# Patient Record
Sex: FEMALE | Race: OTHER | NOT HISPANIC OR LATINO
[De-identification: names, ages, dates, MRNs, and addresses within clinical notes are randomized per-mention and may not be internally consistent; named-entity substitution may affect disease eponyms.]

---

## 2017-02-26 ENCOUNTER — RX RENEWAL (OUTPATIENT)
Age: 82
End: 2017-02-26

## 2017-03-05 ENCOUNTER — RX RENEWAL (OUTPATIENT)
Age: 82
End: 2017-03-05

## 2017-04-12 ENCOUNTER — RX RENEWAL (OUTPATIENT)
Age: 82
End: 2017-04-12

## 2017-05-11 ENCOUNTER — RX RENEWAL (OUTPATIENT)
Age: 82
End: 2017-05-11

## 2017-06-05 ENCOUNTER — APPOINTMENT (OUTPATIENT)
Dept: INTERNAL MEDICINE | Facility: CLINIC | Age: 82
End: 2017-06-05

## 2017-06-26 ENCOUNTER — APPOINTMENT (OUTPATIENT)
Dept: INTERNAL MEDICINE | Facility: CLINIC | Age: 82
End: 2017-06-26

## 2017-06-26 VITALS
OXYGEN SATURATION: 96 % | SYSTOLIC BLOOD PRESSURE: 160 MMHG | RESPIRATION RATE: 12 BRPM | WEIGHT: 140 LBS | BODY MASS INDEX: 32.4 KG/M2 | TEMPERATURE: 98 F | HEART RATE: 55 BPM | HEIGHT: 55 IN | DIASTOLIC BLOOD PRESSURE: 73 MMHG

## 2017-06-26 VITALS — SYSTOLIC BLOOD PRESSURE: 140 MMHG | DIASTOLIC BLOOD PRESSURE: 70 MMHG

## 2017-06-27 LAB
25(OH)D3 SERPL-MCNC: 26.5 NG/ML
ALBUMIN SERPL ELPH-MCNC: 4.3 G/DL
ALP BLD-CCNC: 107 U/L
ALT SERPL-CCNC: 12 U/L
ANION GAP SERPL CALC-SCNC: 16 MMOL/L
AST SERPL-CCNC: 21 U/L
BILIRUB SERPL-MCNC: 0.4 MG/DL
BUN SERPL-MCNC: 29 MG/DL
CALCIUM SERPL-MCNC: 9.3 MG/DL
CHLORIDE SERPL-SCNC: 107 MMOL/L
CHOLEST SERPL-MCNC: 211 MG/DL
CHOLEST/HDLC SERPL: 6.4 RATIO
CO2 SERPL-SCNC: 18 MMOL/L
CREAT SERPL-MCNC: 1.47 MG/DL
GLUCOSE SERPL-MCNC: 84 MG/DL
HBA1C MFR BLD HPLC: 5.8 %
HDLC SERPL-MCNC: 33 MG/DL
LDLC SERPL CALC-MCNC: 131 MG/DL
POTASSIUM SERPL-SCNC: 5.5 MMOL/L
PROT SERPL-MCNC: 8.1 G/DL
SODIUM SERPL-SCNC: 141 MMOL/L
T4 FREE SERPL-MCNC: 1.4 NG/DL
TRIGL SERPL-MCNC: 235 MG/DL
TSH SERPL-ACNC: 1.34 UIU/ML

## 2017-06-28 LAB
BASOPHILS # BLD AUTO: 0.03 K/UL
BASOPHILS NFR BLD AUTO: 0.4 %
EOSINOPHIL # BLD AUTO: 0.16 K/UL
EOSINOPHIL NFR BLD AUTO: 2.2 %
HCT VFR BLD CALC: 42.2 %
HGB BLD-MCNC: 13.4 G/DL
IMM GRANULOCYTES NFR BLD AUTO: 0.3 %
LYMPHOCYTES # BLD AUTO: 2.96 K/UL
LYMPHOCYTES NFR BLD AUTO: 40.2 %
MAN DIFF?: NORMAL
MCHC RBC-ENTMCNC: 27.6 PG
MCHC RBC-ENTMCNC: 31.8 GM/DL
MCV RBC AUTO: 87 FL
MONOCYTES # BLD AUTO: 0.72 K/UL
MONOCYTES NFR BLD AUTO: 9.8 %
NEUTROPHILS # BLD AUTO: 3.47 K/UL
NEUTROPHILS NFR BLD AUTO: 47.1 %
PLATELET # BLD AUTO: 153 K/UL
RBC # BLD: 4.85 M/UL
RBC # FLD: 15.5 %
WBC # FLD AUTO: 7.36 K/UL

## 2017-07-17 ENCOUNTER — INPATIENT (INPATIENT)
Facility: HOSPITAL | Age: 82
LOS: 6 days | Discharge: NURSING, REHAB OR LONG-TERM CARE | DRG: 65 | End: 2017-07-24
Attending: PSYCHIATRY & NEUROLOGY | Admitting: PSYCHIATRY & NEUROLOGY
Payer: MEDICAID

## 2017-07-17 VITALS
OXYGEN SATURATION: 97 % | DIASTOLIC BLOOD PRESSURE: 63 MMHG | RESPIRATION RATE: 18 BRPM | TEMPERATURE: 98 F | HEART RATE: 63 BPM | SYSTOLIC BLOOD PRESSURE: 158 MMHG

## 2017-07-17 DIAGNOSIS — I63.9 CEREBRAL INFARCTION, UNSPECIFIED: ICD-10-CM

## 2017-07-17 LAB
ALBUMIN SERPL ELPH-MCNC: 4 G/DL — SIGNIFICANT CHANGE UP (ref 3.3–5)
ALP SERPL-CCNC: 94 U/L — SIGNIFICANT CHANGE UP (ref 40–120)
ALT FLD-CCNC: 11 U/L RC — SIGNIFICANT CHANGE UP (ref 10–45)
ANION GAP SERPL CALC-SCNC: 14 MMOL/L — SIGNIFICANT CHANGE UP (ref 5–17)
APTT BLD: 27.7 SEC — SIGNIFICANT CHANGE UP (ref 27.5–37.4)
AST SERPL-CCNC: 26 U/L — SIGNIFICANT CHANGE UP (ref 10–40)
BASOPHILS # BLD AUTO: 0.1 K/UL — SIGNIFICANT CHANGE UP (ref 0–0.2)
BASOPHILS NFR BLD AUTO: 0.6 % — SIGNIFICANT CHANGE UP (ref 0–2)
BILIRUB SERPL-MCNC: 0.4 MG/DL — SIGNIFICANT CHANGE UP (ref 0.2–1.2)
BUN SERPL-MCNC: 32 MG/DL — HIGH (ref 7–23)
CALCIUM SERPL-MCNC: 9.4 MG/DL — SIGNIFICANT CHANGE UP (ref 8.4–10.5)
CHLORIDE SERPL-SCNC: 105 MMOL/L — SIGNIFICANT CHANGE UP (ref 96–108)
CO2 SERPL-SCNC: 21 MMOL/L — LOW (ref 22–31)
CREAT SERPL-MCNC: 1.42 MG/DL — HIGH (ref 0.5–1.3)
EOSINOPHIL # BLD AUTO: 0.1 K/UL — SIGNIFICANT CHANGE UP (ref 0–0.5)
EOSINOPHIL NFR BLD AUTO: 1.4 % — SIGNIFICANT CHANGE UP (ref 0–6)
GLUCOSE SERPL-MCNC: 79 MG/DL — SIGNIFICANT CHANGE UP (ref 70–99)
HCT VFR BLD CALC: 42.7 % — SIGNIFICANT CHANGE UP (ref 34.5–45)
HGB BLD-MCNC: 14.2 G/DL — SIGNIFICANT CHANGE UP (ref 11.5–15.5)
INR BLD: 1.05 RATIO — SIGNIFICANT CHANGE UP (ref 0.88–1.16)
LYMPHOCYTES # BLD AUTO: 2.8 K/UL — SIGNIFICANT CHANGE UP (ref 1–3.3)
LYMPHOCYTES # BLD AUTO: 33.8 % — SIGNIFICANT CHANGE UP (ref 13–44)
MCHC RBC-ENTMCNC: 28.8 PG — SIGNIFICANT CHANGE UP (ref 27–34)
MCHC RBC-ENTMCNC: 33.2 GM/DL — SIGNIFICANT CHANGE UP (ref 32–36)
MCV RBC AUTO: 86.5 FL — SIGNIFICANT CHANGE UP (ref 80–100)
MONOCYTES # BLD AUTO: 0.9 K/UL — SIGNIFICANT CHANGE UP (ref 0–0.9)
MONOCYTES NFR BLD AUTO: 11.5 % — SIGNIFICANT CHANGE UP (ref 2–14)
NEUTROPHILS # BLD AUTO: 4.3 K/UL — SIGNIFICANT CHANGE UP (ref 1.8–7.4)
NEUTROPHILS NFR BLD AUTO: 52.6 % — SIGNIFICANT CHANGE UP (ref 43–77)
PLATELET # BLD AUTO: 130 K/UL — LOW (ref 150–400)
POTASSIUM SERPL-MCNC: 5.2 MMOL/L — SIGNIFICANT CHANGE UP (ref 3.5–5.3)
POTASSIUM SERPL-SCNC: 5.2 MMOL/L — SIGNIFICANT CHANGE UP (ref 3.5–5.3)
PROT SERPL-MCNC: 7.7 G/DL — SIGNIFICANT CHANGE UP (ref 6–8.3)
PROTHROM AB SERPL-ACNC: 11.3 SEC — SIGNIFICANT CHANGE UP (ref 9.8–12.7)
RBC # BLD: 4.93 M/UL — SIGNIFICANT CHANGE UP (ref 3.8–5.2)
RBC # FLD: 13 % — SIGNIFICANT CHANGE UP (ref 10.3–14.5)
SODIUM SERPL-SCNC: 140 MMOL/L — SIGNIFICANT CHANGE UP (ref 135–145)
WBC # BLD: 8.2 K/UL — SIGNIFICANT CHANGE UP (ref 3.8–10.5)
WBC # FLD AUTO: 8.2 K/UL — SIGNIFICANT CHANGE UP (ref 3.8–10.5)

## 2017-07-17 PROCEDURE — 70450 CT HEAD/BRAIN W/O DYE: CPT | Mod: 26

## 2017-07-17 PROCEDURE — 71010: CPT | Mod: 26

## 2017-07-17 PROCEDURE — 72125 CT NECK SPINE W/O DYE: CPT | Mod: 26

## 2017-07-17 PROCEDURE — 99285 EMERGENCY DEPT VISIT HI MDM: CPT | Mod: 25

## 2017-07-17 PROCEDURE — 93010 ELECTROCARDIOGRAM REPORT: CPT

## 2017-07-17 RX ORDER — LEVOTHYROXINE SODIUM 125 MCG
0 TABLET ORAL
Qty: 0 | Refills: 0 | COMMUNITY

## 2017-07-17 RX ORDER — ENOXAPARIN SODIUM 100 MG/ML
40 INJECTION SUBCUTANEOUS EVERY 24 HOURS
Qty: 0 | Refills: 0 | Status: DISCONTINUED | OUTPATIENT
Start: 2017-07-17 | End: 2017-07-17

## 2017-07-17 RX ORDER — LEVOTHYROXINE SODIUM 125 MCG
1 TABLET ORAL
Qty: 0 | Refills: 0 | COMMUNITY

## 2017-07-17 RX ORDER — LOSARTAN POTASSIUM 100 MG/1
1 TABLET, FILM COATED ORAL
Qty: 0 | Refills: 0 | COMMUNITY

## 2017-07-17 RX ORDER — LOSARTAN POTASSIUM 100 MG/1
25 TABLET, FILM COATED ORAL DAILY
Qty: 0 | Refills: 0 | Status: DISCONTINUED | OUTPATIENT
Start: 2017-07-17 | End: 2017-07-24

## 2017-07-17 RX ORDER — ENOXAPARIN SODIUM 100 MG/ML
30 INJECTION SUBCUTANEOUS EVERY 24 HOURS
Qty: 0 | Refills: 0 | Status: DISCONTINUED | OUTPATIENT
Start: 2017-07-17 | End: 2017-07-24

## 2017-07-17 RX ORDER — ASPIRIN/CALCIUM CARB/MAGNESIUM 324 MG
81 TABLET ORAL DAILY
Qty: 0 | Refills: 0 | Status: DISCONTINUED | OUTPATIENT
Start: 2017-07-17 | End: 2017-07-24

## 2017-07-17 RX ORDER — LOSARTAN POTASSIUM 100 MG/1
50 TABLET, FILM COATED ORAL
Qty: 0 | Refills: 0 | COMMUNITY

## 2017-07-17 RX ORDER — LEVOTHYROXINE SODIUM 125 MCG
50 TABLET ORAL
Qty: 0 | Refills: 0 | COMMUNITY

## 2017-07-17 RX ORDER — METOPROLOL TARTRATE 50 MG
50 TABLET ORAL ONCE
Qty: 0 | Refills: 0 | Status: COMPLETED | OUTPATIENT
Start: 2017-07-17 | End: 2017-07-17

## 2017-07-17 RX ORDER — ATORVASTATIN CALCIUM 80 MG/1
80 TABLET, FILM COATED ORAL AT BEDTIME
Qty: 0 | Refills: 0 | Status: DISCONTINUED | OUTPATIENT
Start: 2017-07-17 | End: 2017-07-24

## 2017-07-17 RX ORDER — METOPROLOL TARTRATE 50 MG
1 TABLET ORAL
Qty: 0 | Refills: 0 | COMMUNITY

## 2017-07-17 RX ADMIN — ATORVASTATIN CALCIUM 80 MILLIGRAM(S): 80 TABLET, FILM COATED ORAL at 20:17

## 2017-07-17 RX ADMIN — Medication 81 MILLIGRAM(S): at 16:33

## 2017-07-17 RX ADMIN — LOSARTAN POTASSIUM 25 MILLIGRAM(S): 100 TABLET, FILM COATED ORAL at 16:33

## 2017-07-17 RX ADMIN — Medication 50 MILLIGRAM(S): at 16:33

## 2017-07-17 NOTE — H&P ADULT - NSHPPHYSICALEXAM_GEN_ALL_CORE
General Adult Exam  GENERAL APPEARANCE: Well developed, well nourished, alert and cooperative, and appears to be in no acute distress.  EYES: PERRL, EOMI. Fundi normal, vision is grossly intact.  SKIN: Skin normal color, texture and turgor with no lesions or eruptions.    Neurological Exam:  Mental Status: Orientated to self, date and place.  Attention intact.  No dysarthria, aphasia or neglect.  Knowledge intact.  Registration intact.  Short and long term memory grossly intact.      Cranial Nerves: CN I - not tested.  PERRL, EOMI, VFF, no nystagmus or diplopia.  No APD.  CN V1-3 intact to light touch and pinprick.  No facial asymmetry.  Hearing intact to finger rub bilaterally.  Tongue, uvula and palate midline.  Sternocleidomastoid and Trapezius intact bilaterally.    Motor:   Tone: normal.                  Strength:     [] Upper extremity                      Delt       Bicep    Tricep                                                  R         4/5        4/5        4/5       4/5                                               L          5/5        5/5        5/5       5/5  [] Lower extremity                       HF          KE          KF        DF         PF                                               R        4/5        4/5        4/5       4/5       4/5                                               L         5/5        5/5       5/5       5/5        5/5  Pronator drift: none                 Dysmetria: Positive on right finger-nose-finger and heel-shin-heel  Tremor: No resting, postural or action tremor.  No myoclonus.    Sensation: decreased sensation in RUE and RLE to light touch, pinprick    Deep Tendon Reflexes: 2+ right biceps, brachioradialis, knee; 1+ left biceps, brachioradialis, knee; absent ankle  Toes: Up on right, down on left    Gait: Unsteady gait, needs assistance to walk.

## 2017-07-17 NOTE — ED PROVIDER NOTE - MUSCULOSKELETAL, MLM
No midlines spinal tenderness.  5/5 strength b/l UE and LE with slight discrepancy of right being weaker than left.

## 2017-07-17 NOTE — ED ADULT NURSE REASSESSMENT NOTE - NS ED NURSE REASSESS COMMENT FT1
1900 Pt passed dysphagia and pt provided a sandwich at this time blood pressure better and lower Mpuleorn

## 2017-07-17 NOTE — H&P ADULT - ASSESSMENT
88 year old female with history of hypothyroidism, HTN, GERD presenting with right sided weakness with decreased sensation for the past 2 weeks. Neuro exam reveals reflexes greater on the right with upward plantar flexion, decreased sensation in R sided extremities which is likely due to left sided infarct. 88 year old female with history of hypothyroidism, HTN, GERD presenting with right sided weakness with decreased sensation for the past 2 weeks. Neuro exam reveals reflexes greater on the right with upward plantar flexion, decreased sensation in R sided extremities which is likely due to left sided infarct. CT head negative for acute pathology.

## 2017-07-17 NOTE — ED ADULT NURSE NOTE - OBJECTIVE STATEMENT
Pt fell a week ago felt week on the rue and rle Pt has been using a  cane at home to ambulate around the house.  Pt has some weakness right lower but can ambulate with some assistance

## 2017-07-17 NOTE — ED PROVIDER NOTE - OBJECTIVE STATEMENT
88 y.o. s/p mechanical fall 1 week ago onto her right side.  Was able to ambulate after wards.  Since has developed some right sided weakness 88 y.o. s/p fall 1 week ago.  Pt was walking in the supermarket when her right side went numb, her right leg gave out and she fell to her left side.  Was able to get up with assistance and walk to her car and get home.  Since she has been living at home, performing all activity of daily living however feels slightly weak in the RUE and right LE.  Has also had persistent numbness in the RLE and RUE.  Has been relying on her cane more but no other complaints.  No pain, no headaches, no fevers no chills.  Did not hit head when she fell, no neck pain

## 2017-07-17 NOTE — H&P ADULT - NSHPLABSRESULTS_GEN_ALL_CORE
14.2   8.2   )-----------( 130      ( 17 Jul 2017 15:18 )             42.7     07-17    140  |  105  |  32<H>  ----------------------------<  79  5.2   |  21<L>  |  1.42<H>    Ca    9.4      17 Jul 2017 15:18    TPro  7.7  /  Alb  4.0  /  TBili  0.4  /  DBili  x   /  AST  26  /  ALT  11  /  AlkPhos  94  07-17    PT/INR - ( 17 Jul 2017 15:18 )   PT: 11.3 sec;   INR: 1.05 ratio      PTT - ( 17 Jul 2017 15:18 )  PTT:27.7 sec    I&O's Summary    Vital Signs Last 24 Hrs  T(C): 36.6 (17 Jul 2017 12:34), Max: 36.6 (17 Jul 2017 12:34)  T(F): 97.9 (17 Jul 2017 12:34), Max: 97.9 (17 Jul 2017 12:34)  HR: 63 (17 Jul 2017 12:34) (63 - 63)  BP: 158/63 (17 Jul 2017 12:34) (158/63 - 158/63)  BP(mean): --  RR: 18 (17 Jul 2017 12:34) (18 - 18)  SpO2: 97% (17 Jul 2017 12:34) (97% - 97%) 14.2   8.2   )-----------( 130      ( 17 Jul 2017 15:18 )             42.7     07-17    140  |  105  |  32<H>  ----------------------------<  79  5.2   |  21<L>  |  1.42<H>    Ca    9.4      17 Jul 2017 15:18    TPro  7.7  /  Alb  4.0  /  TBili  0.4  /  DBili  x   /  AST  26  /  ALT  11  /  AlkPhos  94  07-17    PT/INR - ( 17 Jul 2017 15:18 )   PT: 11.3 sec;   INR: 1.05 ratio      PTT - ( 17 Jul 2017 15:18 )  PTT:27.7 sec    I&O's Summary    Vital Signs Last 24 Hrs  T(C): 36.6 (17 Jul 2017 12:34), Max: 36.6 (17 Jul 2017 12:34)  T(F): 97.9 (17 Jul 2017 12:34), Max: 97.9 (17 Jul 2017 12:34)  HR: 63 (17 Jul 2017 12:34) (63 - 63)  BP: 158/63 (17 Jul 2017 12:34) (158/63 - 158/63)  BP(mean): --  RR: 18 (17 Jul 2017 12:34) (18 - 18)  SpO2: 97% (17 Jul 2017 12:34) (97% - 97%)    CT Head: No  acute  hemorrhage,  major  distribution  infarct  or  mass  effect.

## 2017-07-17 NOTE — ED ADULT NURSE NOTE - CHIEF COMPLAINT QUOTE
mechanical trip and fall one week ago and since then has been having right hip/leg pain/weakness  denies head inj or loc  denies blood thinners

## 2017-07-17 NOTE — H&P ADULT - NSHPREVIEWOFSYSTEMS_GEN_ALL_CORE
REVIEW OF SYSTEMS:  Constitutional: No fever, chills, fatigue, weakness.  Eyes: No eye pain, visual disturbances, or discharge.  ENT:  No difficulty hearing, tinnitus, vertigo. No sinus or throat pain.  Neck: No pain or stiffness.  Respiratory: No cough, dyspnea, wheezing.  Cardiovascular: No chest pain, palpitations.  Gastrointestinal: No abdominal pain. No nausea, vomiting, diarrhea, or constipation.   Genitourinary: No dysuria, frequency, hematuria or incontinence.  Neurological: Decreased sensation over right upper and lower extremities. No headaches, lightheadedness, vertigo, numbness or tremors.  Psychiatric: No depression, anxiety, mood swings, or difficulty sleeping.  Musculoskeletal: No joint pain or swelling. No muscle, back, or extremity pain.  Skin: No itching, burning, rashes or lesions.   Lymph Nodes: No enlarged glands  Endocrine: No heat or cold intolerance, no hair loss.  Allergy and Immunologic: No hives or eczema.

## 2017-07-17 NOTE — H&P ADULT - HISTORY OF PRESENT ILLNESS
Patient is an 88 year old female presenting to the ED with right sided weakness for the past 2 weeks. Patient has PMH of hypothyroidism, HTN, and GERD. As per patient, she describes the weakness as a feeling of emptiness on the right upper and lower extremities with decreased sensation. She denies numbness or tingling. She almost fell 1 week ago which was when she told her daughter about the symptoms. Today her daughter called the PMD and was told to come to the ED. Patient at baseline ambulates with a cane and is able to eat and use the bathroom by herself. Family is unable to help her up and down the stairs at home.

## 2017-07-17 NOTE — ED ADULT TRIAGE NOTE - CHIEF COMPLAINT QUOTE
mechanical trip and fall one week ago and since then has been having right hip/leg pain  denies head inj or loc  denies blood thinners

## 2017-07-17 NOTE — ED PROVIDER NOTE - ATTENDING CONTRIBUTION TO CARE
88y F hx HTN here with R sided numbness, weakness. Pt was walking in supermarket about 1 week ago when her R side went numb and she collapsed to the ground onto her L side. Over last week has had inc difficulty ambulating and persistent weakness to RUE and RLE. On exam A&Ox3, CN intact, motor 5/5 bl LUE and LLE, motor 4/5 RLE, 5/5 RUE, dysmetria on finger to nose and heel to shin. RRR no murmur CTA BL abd soft NTND. A: 88y F hx HTN here w R sided weakness and gait instability x1 week, r/o CVA. Labs, CTBrain, Neuro cs

## 2017-07-17 NOTE — PATIENT PROFILE ADULT. - ABILITY TO HEAR (WITH HEARING AID OR HEARING APPLIANCE IF NORMALLY USED):
Mildly to Moderately Impaired: difficulty hearing in some environments or speaker may need to increase volume or speak distinctly/L ear hearing loss

## 2017-07-17 NOTE — H&P ADULT - ATTENDING COMMENTS
The patient was seen and examined by me. Imaging (head CT, brain MRI, MRA neck and head) reviewed by me. This is an 88F with HTN, GERD who presents with right hemiparesis for 2 weeks. MRI shows a left thalamocapsular acute ischemic stroke, likely small vessel disease/lacunar etiology. On exam she is awake and alert, oriented x3, fluent, normal naming and repetition, follows 3 step commands. Extraocular movements intact, no nystagmus, visual fields full, face symmetric, tongue midline. No drift, 4+/5 RUE and RLE. 5/5 power LUE and LLE. Decreased sensation to LT RUE and RLE, facial sparing. Normal finger-to-nose and rapid alternating movements on the left. Impression: Cerebral thrombosis with cerebral infarction secondary to HTN. Plan is ASA, Lipitor, continue ARB for hypertension, PT/OT evals, passed dysphagia and tolerating diet, so no speech and swallow eval needed. Lovenox VTE prophylaxis. TTE, but can be down as outpatient if it delays discharge. Dispo pending PT/OT evals. Neuro resident to communicate with the patients family who were not at bedside today.

## 2017-07-18 ENCOUNTER — TRANSCRIPTION ENCOUNTER (OUTPATIENT)
Age: 82
End: 2017-07-18

## 2017-07-18 PROCEDURE — 70551 MRI BRAIN STEM W/O DYE: CPT | Mod: 26

## 2017-07-18 PROCEDURE — 70547 MR ANGIOGRAPHY NECK W/O DYE: CPT | Mod: 26

## 2017-07-18 PROCEDURE — 99223 1ST HOSP IP/OBS HIGH 75: CPT

## 2017-07-18 RX ORDER — ASPIRIN/CALCIUM CARB/MAGNESIUM 324 MG
1 TABLET ORAL
Qty: 0 | Refills: 0 | COMMUNITY
Start: 2017-07-18

## 2017-07-18 RX ADMIN — LOSARTAN POTASSIUM 25 MILLIGRAM(S): 100 TABLET, FILM COATED ORAL at 06:26

## 2017-07-18 RX ADMIN — Medication 1 DROP(S): at 22:06

## 2017-07-18 RX ADMIN — Medication 81 MILLIGRAM(S): at 11:49

## 2017-07-18 RX ADMIN — ATORVASTATIN CALCIUM 80 MILLIGRAM(S): 80 TABLET, FILM COATED ORAL at 22:06

## 2017-07-18 RX ADMIN — ENOXAPARIN SODIUM 30 MILLIGRAM(S): 100 INJECTION SUBCUTANEOUS at 00:27

## 2017-07-18 RX ADMIN — ENOXAPARIN SODIUM 30 MILLIGRAM(S): 100 INJECTION SUBCUTANEOUS at 22:06

## 2017-07-18 NOTE — DISCHARGE NOTE ADULT - ABILITY TO HEAR (WITH HEARING AID OR HEARING APPLIANCE IF NORMALLY USED):
L ear hearing loss/Mildly to Moderately Impaired: difficulty hearing in some environments or speaker may need to increase volume or speak distinctly

## 2017-07-18 NOTE — DISCHARGE NOTE ADULT - MEDICATION SUMMARY - MEDICATIONS TO TAKE
I will START or STAY ON the medications listed below when I get home from the hospital:    aspirin 81 mg oral delayed release tablet  -- 1 tab(s) by mouth once a day  -- Indication: For Stroke    losartan 50 mg oral tablet  -- 1 tab(s) by mouth once a day  -- Indication: For Hypertension    atorvastatin 80 mg oral tablet  -- 1 tab(s) by mouth once a day (at bedtime)  -- Indication: For Hyperlipidemia    metoprolol succinate 25 mg oral tablet, extended release  -- 1 tab(s) by mouth once a day  -- Indication: For Hypertension    ocular lubricant ophthalmic solution  -- 1 drop(s) to each affected eye every 12 hours, As needed, Dry Eyes  -- Indication: For Eye Drops    levothyroxine 50 mcg (0.05 mg) oral capsule  -- 1 cap(s) by mouth once a day  -- Indication: For Hypothyroid

## 2017-07-18 NOTE — DISCHARGE NOTE ADULT - PATIENT PORTAL LINK FT
“You can access the [employer] Patient Portal, offered by [facility], by registering with the following website: [email or web address]”

## 2017-07-18 NOTE — DISCHARGE NOTE ADULT - CARE PLAN
Principal Discharge DX:	Cerebrovascular accident (CVA), unspecified mechanism  Goal:	Secondary Stroke Prevention  Instructions for follow-up, activity and diet:	Please follow up with your primary medical doctor within one week of discharge from the hospital.    Please follow up with your neurologist within one week of discharge from the hospital.    Please continue to take all your medications as prescribed by your doctor.    Please continue to participate in your physical therapy as directed to regain strength. Principal Discharge DX:	Cerebrovascular accident (CVA), unspecified mechanism  Goal:	Secondary Stroke Prevention  Instructions for follow-up, activity and diet:	Please follow up with your primary medical doctor within one to two weeks of discharge from the hospital.    Please follow up with your stroke neurologist (Dr. Jamil Philippe or Dr. Aurelio Currie) within one to two weeks of discharge from the hospital.    Please continue to take all your medications as prescribed by your doctor.    Please continue to participate in your physical therapy as directed to regain strength. Principal Discharge DX:	Cerebrovascular accident (CVA), unspecified mechanism  Goal:	Secondary Stroke Prevention  Instructions for follow-up, activity and diet:	Please continue all meds as directed. Follow-up with Stroke Neurologist Dr. Currie or Dr. Jamil Philippe as next available.     Please follow up with your stroke neurologist (Dr. Jamil Philippe or Dr. Aurelio Currie) within one to two weeks of discharge from the hospital.    Please continue to take all your medications as prescribed by your doctor.    Please continue to participate in your physical therapy as directed to regain strength.

## 2017-07-18 NOTE — DISCHARGE NOTE ADULT - HOSPITAL COURSE
Patient is an 88 year old female presenting to the ED with right sided weakness for the past 2 weeks. Patient has PMH of hypothyroidism, HTN, and GERD. As per patient, she describes the weakness as a feeling of emptiness on the right upper and lower extremities with decreased sensation. She denies numbness or tingling. She almost fell 1 week ago which was when she told her daughter about the symptoms. Patient at baseline ambulates with a cane and is able to eat and use the bathroom by herself. Family is unable to help her up and down the stairs at home.    Pt was admitted to the stroke unit on 7/18, MRI showed acute/subacute infarct of the posterior limb capsule with involvement of a small portion of the globus pallidus. Neck MRA showed significant stenosis. Brain MRA showed possible focal narrowing of the distal right internal carotid artery. No evidence for occlusion. Patient is an 88 year old female presenting to the ED with right sided weakness for the past 2 weeks. Patient has PMH of hypothyroidism, HTN, and GERD. As per patient, she describes the weakness as a feeling of emptiness on the right upper and lower extremities with decreased sensation. She denies numbness or tingling. She almost fell 1 week ago which was when she told her daughter about the symptoms. Patient at baseline ambulates with a cane and is able to eat and use the bathroom by herself. Family is unable to help her up and down the stairs at home.    Pt was admitted to the stroke unit on 7/18, MRI showed acute/subacute infarct of the posterior limb capsule with involvement of a small portion of the globus pallidus. Neck MRA showed significant stenosis. Brain MRA showed possible focal narrowing of the distal right internal carotid artery. No evidence for occlusion.    Transthoracic Echocardiogram done on 7/21 showed_____. Considering pt's ago will not plan for loop placement, risk of capturing abnormal rhythm versus risk of repeat stroke due to possible A-Fib is low. Stroke cause is ________. 88 year old woman presenting to the ED with right sided weakness for the past 2 weeks. Patient has PMH of hypothyroidism, HTN, and GERD. As per patient, she describes the weakness as a feeling of emptiness on the right upper and lower extremities with decreased sensation. She denies numbness or tingling. She almost fell 1 week ago which was when she told her daughter about the symptoms. Patient at baseline ambulates with a cane and is able to eat and use the bathroom by herself. Family is unable to help her up and down the stairs at home.    Pt was admitted to the stroke unit on 7/18, MRI showed acute/subacute infarct of the posterior limb capsule with involvement of a small portion of the globus pallidus. Neck MRA showed no significant stenosis. Brain MRA showed possible focal narrowing of the distal right internal carotid artery. No evidence for occlusion.Transthoracic Echocardiogram done on 7/21 showed no PFO, normal LA, LV EF 78%, A1c was 5.7, . Pt was started on Asa 81 and Atorvastatin 80mg.     She is currently tolerating a regular texture diet. She was seen and evaluated by PT/OT, who recommended Subacute Rehab.     Please continue all meds as directed. Follow-up with Stroke Neurologist Dr. Currie or Dr. Jamil Philippe as next available, as well as your primary doctor in 1 week.

## 2017-07-18 NOTE — DISCHARGE NOTE ADULT - CARE PROVIDERS DIRECT ADDRESSES
,[email or web address],DirectAddress_Unknown

## 2017-07-18 NOTE — DISCHARGE NOTE ADULT - NS AS DC STROKE ED MATERIALS
Stroke Education Booklet/Call 911 for Stroke/Risk Factors for Stroke/Need for Followup After Discharge/Prescribed Medications/Stroke Warning Signs and Symptoms

## 2017-07-18 NOTE — DISCHARGE NOTE ADULT - PROVIDER TOKENS
CASS:'[id number]' TOKEN:'[id number]',FREE:[LAST:[Primary],FIRST:[Doctor],PHONE:[(   )    -],FAX:[(   )    -]]

## 2017-07-18 NOTE — DISCHARGE NOTE ADULT - OTHER SIGNIFICANT FINDINGS
A1c 5.7,     Brain CT: No acute hemorrhage, major distribution infarct or mass effect.    Cervical spine CT: Multilevel degenerative changes with moderate right neural foraminal stenosis at C3-4 and C4-5 and bilaterally at C5-6. Cervical spine MRI is recommended for further evaluation.    MRI Brain w/o:   Left acute/subacute infarct of the posterior limb capsule with involvement of a small portion of the globus pallidus.  Neck MRA: No significant stenosis.  Brain MRA: Motion limited study. Possible focal narrowing of the distal right internal carotid artery. No evidence for occlusion.    TTE: Normal LA, LV EF 78%, no PFO  1. Increased relative wall thickness with normal left ventricular mass index, consistent with concentric left ventricular remodeling.  2. Hyperdynamic left ventricle.

## 2017-07-18 NOTE — DISCHARGE NOTE ADULT - CARE PROVIDER_API CALL
Aurelio Currie (JANICE), Neurology; Vascular Neurology  [address] [place] [address]  Phone: [phone]  Fax: [phone] Aurelio Currie (JANICE), Neurology; Vascular Neurology  [address] [place] [address]  Phone: [phone]  Fax: [phone]    Primary, Doctor  Phone: (   )    -  Fax: (   )    -

## 2017-07-18 NOTE — DISCHARGE NOTE ADULT - PLAN OF CARE
Secondary Stroke Prevention Please follow up with your primary medical doctor within one week of discharge from the hospital.    Please follow up with your neurologist within one week of discharge from the hospital.    Please continue to take all your medications as prescribed by your doctor.    Please continue to participate in your physical therapy as directed to regain strength. Please follow up with your primary medical doctor within one to two weeks of discharge from the hospital.    Please follow up with your stroke neurologist (Dr. Jamil Philippe or Dr. Aurelio Currie) within one to two weeks of discharge from the hospital.    Please continue to take all your medications as prescribed by your doctor.    Please continue to participate in your physical therapy as directed to regain strength. Please continue all meds as directed. Follow-up with Stroke Neurologist Dr. Currie or Dr. Jamil Philippe as next available.     Please follow up with your stroke neurologist (Dr. Jamil Philippe or Dr. Aurelio Currie) within one to two weeks of discharge from the hospital.    Please continue to take all your medications as prescribed by your doctor.    Please continue to participate in your physical therapy as directed to regain strength.

## 2017-07-19 LAB
ANION GAP SERPL CALC-SCNC: 14 MMOL/L — SIGNIFICANT CHANGE UP (ref 5–17)
BUN SERPL-MCNC: 30 MG/DL — HIGH (ref 7–23)
CALCIUM SERPL-MCNC: 9.2 MG/DL — SIGNIFICANT CHANGE UP (ref 8.4–10.5)
CHLORIDE SERPL-SCNC: 105 MMOL/L — SIGNIFICANT CHANGE UP (ref 96–108)
CHOLEST SERPL-MCNC: 166 MG/DL — SIGNIFICANT CHANGE UP (ref 10–199)
CO2 SERPL-SCNC: 21 MMOL/L — LOW (ref 22–31)
CREAT SERPL-MCNC: 1.14 MG/DL — SIGNIFICANT CHANGE UP (ref 0.5–1.3)
GLUCOSE SERPL-MCNC: 96 MG/DL — SIGNIFICANT CHANGE UP (ref 70–99)
HBA1C BLD-MCNC: 5.7 % — HIGH (ref 4–5.6)
HCT VFR BLD CALC: 40.9 % — SIGNIFICANT CHANGE UP (ref 34.5–45)
HDLC SERPL-MCNC: 33 MG/DL — LOW (ref 40–125)
HGB BLD-MCNC: 13.1 G/DL — SIGNIFICANT CHANGE UP (ref 11.5–15.5)
LIPID PNL WITH DIRECT LDL SERPL: 110 MG/DL — SIGNIFICANT CHANGE UP
MCHC RBC-ENTMCNC: 26.5 PG — LOW (ref 27–34)
MCHC RBC-ENTMCNC: 32 GM/DL — SIGNIFICANT CHANGE UP (ref 32–36)
MCV RBC AUTO: 82.8 FL — SIGNIFICANT CHANGE UP (ref 80–100)
PLATELET # BLD AUTO: 147 K/UL — LOW (ref 150–400)
POTASSIUM SERPL-MCNC: 4.5 MMOL/L — SIGNIFICANT CHANGE UP (ref 3.5–5.3)
POTASSIUM SERPL-SCNC: 4.5 MMOL/L — SIGNIFICANT CHANGE UP (ref 3.5–5.3)
RBC # BLD: 4.94 M/UL — SIGNIFICANT CHANGE UP (ref 3.8–5.2)
RBC # FLD: 14.3 % — SIGNIFICANT CHANGE UP (ref 10.3–14.5)
SODIUM SERPL-SCNC: 140 MMOL/L — SIGNIFICANT CHANGE UP (ref 135–145)
TOTAL CHOLESTEROL/HDL RATIO MEASUREMENT: 5 RATIO — SIGNIFICANT CHANGE UP (ref 3.3–7.1)
TRIGL SERPL-MCNC: 117 MG/DL — SIGNIFICANT CHANGE UP (ref 10–149)
WBC # BLD: 7.48 K/UL — SIGNIFICANT CHANGE UP (ref 3.8–10.5)
WBC # FLD AUTO: 7.48 K/UL — SIGNIFICANT CHANGE UP (ref 3.8–10.5)

## 2017-07-19 PROCEDURE — 99233 SBSQ HOSP IP/OBS HIGH 50: CPT

## 2017-07-19 RX ORDER — HYDRALAZINE HCL 50 MG
5 TABLET ORAL ONCE
Qty: 0 | Refills: 0 | Status: COMPLETED | OUTPATIENT
Start: 2017-07-19 | End: 2017-07-19

## 2017-07-19 RX ADMIN — Medication 81 MILLIGRAM(S): at 13:49

## 2017-07-19 RX ADMIN — LOSARTAN POTASSIUM 25 MILLIGRAM(S): 100 TABLET, FILM COATED ORAL at 05:18

## 2017-07-19 RX ADMIN — ATORVASTATIN CALCIUM 80 MILLIGRAM(S): 80 TABLET, FILM COATED ORAL at 21:34

## 2017-07-19 RX ADMIN — Medication 5 MILLIGRAM(S): at 01:16

## 2017-07-19 RX ADMIN — ENOXAPARIN SODIUM 30 MILLIGRAM(S): 100 INJECTION SUBCUTANEOUS at 21:34

## 2017-07-19 NOTE — OCCUPATIONAL THERAPY INITIAL EVALUATION ADULT - PERTINENT HX OF CURRENT PROBLEM, REHAB EVAL
87 yo F p/w R sided weakness for the past 2 weeks. As per pt, she describes the weakness as a feeling of emptiness on the RUE/RLE with decreased sensation. She almost fell 1 week ago which was when she told her daughter about the symptoms. Today her daughter called the PMD and was told to come to the ED.

## 2017-07-19 NOTE — PHYSICAL THERAPY INITIAL EVALUATION ADULT - PRECAUTIONS/LIMITATIONS, REHAB EVAL
XRAY CHEST: No acute consolidation could be identified.Neck MRA: Significant stenosis.Brain MRA: Motion limited study. Possible focal narrowing of the distal right internal carotid artery. No evidence for occlusion. CT C/S: Multilevel degenerative changes with moderate right neural foraminal stenosis at C3-4 and C4-5 and bilaterally at C5-6. Cervical spine MRI is recommended for further evaluation. CTH: No acute hemorrhage, major distribution infarct or mass effect. XRAY CHEST: No acute consolidation could be identified.Neck MRA: Significant stenosis.Brain MRA: Motion limited study. Possible focal narrowing of the distal right internal carotid artery. No evidence for occlusion. CT C/S: Multilevel degenerative changes with moderate right neural foraminal stenosis at C3-4 and C4-5 and bilaterally at C5-6. Cervical spine MRI is recommended for further evaluation. CTH: No acute hemorrhage, major distribution infarct or mass effect./fall precautions

## 2017-07-19 NOTE — PROGRESS NOTE ADULT - ASSESSMENT
ASSESSMENT: 88F with HTN, GERD who presents with right hemiparesis for 2 weeks. MRI shows a left thalamocapsular acute ischemic stroke, likely small vessel disease/lacunar etiology. On exam she is awake and alert, oriented x3, fluent, normal naming and repetition, follows 3 step commands. Extraocular movements intact, no nystagmus, visual fields full, face symmetric, tongue midline. No drift, 4+/5 RUE and RLE. 5/5 power LUE and LLE. Decreased sensation to LT RUE and RLE, facial sparing. Normal finger-to-nose and rapid alternating movements on the left. Impression: Cerebral thrombosis with cerebral infarction secondary to HTN.     NEURO: Continue close monitoring for neurologic deterioration, permissive HTN, titrate statin to LDL goal less than 70, MRI Brain w/o, MRA Head w/o and Neck w/contrast. Physical therapy/OT/Speech eval/treatment.     ANTITHROMBOTIC THERAPY:     PULMONARY: CXR clear, protecting airway, saturating well     CARDIOVASCULAR: check TTE, cardiac monitoring                              SBP goal:     GASTROINTESTINAL: Ulcer prophylaxis, dysphagia screen       Diet:     RENAL: BUN/Cr stable, good urine output      Na Goal: Greater than 135     Veliz:    HEMATOLOGY: H/H stable, Platelets normal      DVT ppx: Heparin s.c [] LMWH []     ID: afebrile, no leukocytosis     OTHER:     DISPOSITION: Rehab or home depending on PT eval once stable and workup is complete      CORE MEASURES:        Admission NIHSS:      TPA: [] YES [] NO      LDL/HDL:     Depression Screen:      Statin Therapy:     Dysphagia Screen: [] PASS [] FAIL     Smoking [] YES [] NO      Afib [] YES [] NO     Stroke Education [] YES [] NO ASSESSMENT: 88F with HTN, GERD who presents with right hemiparesis for 2 weeks. MRI shows a left thalamocapsular acute ischemic stroke, likely small vessel disease/lacunar etiology. Impression: Cerebral thrombosis with cerebral infarction secondary to HTN.     NEURO: neurologically without acute change, titration of SBP to normotension, titrate statin to LDL goal less than 70, MR imaging as noted above, Physical therapy/OT eval pending.    ANTITHROMBOTIC THERAPY: ASA    PULMONARY: CXR clear, protecting airway, saturating well     CARDIOVASCULAR: check TTE, cardiac monitoring no acute events were noted                            SBP goal: normotension    GASTROINTESTINAL: dysphagia screen  passed, on regular diet and tolerating well      Diet: regular     RENAL: BUN/Cr slightly elevated, encourage hydration, good urine output      Na Goal: Greater than 135     Veliz:n    HEMATOLOGY: H/H without acute change, no active bleeding, Platelets 147     DVT ppx: Heparin s.c [] LMWH [x]     ID: afebrile, no leukocytosis, no si/sx of infection     OTHER: plan of care d/w family via telephone.     DISPOSITION: Rehab or home depending on PT eval asstable and workup is complete      CORE MEASURES:        Admission NIHSS:      TPA: [] YES [x] NO      LDL/HDL:110/33     Depression Screen: 0     Statin Therapy:y     Dysphagia Screen: [x] PASS [] FAIL     Smoking [] YES [x] NO      Afib [] YES [x] NO     Stroke Education [x] YES [] NO ASSESSMENT: 88F with HTN, GERD who presents with right hemiparesis for 2 weeks. MRI shows a left thalamocapsular acute ischemic stroke, likely small vessel disease/lacunar etiology. Impression: Cerebral thrombosis with cerebral infarction secondary to HTN.     NEURO: neurologically without acute change, titration of SBP to normotension, titrate statin to LDL goal less than 70, MR imaging as noted above, Physical therapy/OT eval pending.    ANTITHROMBOTIC THERAPY: ASA    PULMONARY: CXR clear, protecting airway, saturating well     CARDIOVASCULAR: check TTE, cardiac monitoring no acute events were noted                            SBP goal: normotension    GASTROINTESTINAL: dysphagia screen  passed, on regular diet and tolerating well      Diet: regular     RENAL: BUN/Cr slightly elevated, encourage hydration, good urine output      Na Goal: Greater than 135     Veliz:n    HEMATOLOGY: H/H without acute change, no active bleeding, Platelets 147     DVT ppx: Heparin s.c [] LMWH [x]     ID: afebrile, no leukocytosis, no si/sx of infection     OTHER: plan of care d/w family via telephone.     DISPOSITION: Rehab or home depending on PT eval as stable and workup is complete      CORE MEASURES:        Admission NIHSS:      TPA: [] YES [x] NO      LDL/HDL:110/33     Depression Screen: 0     Statin Therapy:y     Dysphagia Screen: [x] PASS [] FAIL     Smoking [] YES [x] NO      Afib [] YES [x] NO     Stroke Education [x] YES [] NO

## 2017-07-19 NOTE — OCCUPATIONAL THERAPY INITIAL EVALUATION ADULT - PLANNED THERAPY INTERVENTIONS, OT EVAL
motor coordination training/cognitive, visual perceptual/ADL retraining/transfer training/balance training/bed mobility training/fine motor coordination training/strengthening/neuromuscular re-education

## 2017-07-19 NOTE — PHYSICAL THERAPY INITIAL EVALUATION ADULT - GAIT DEVIATIONS NOTED, PT EVAL
decreased weight-shifting ability/decreased stride length/increased time in double stance/decreased velocity of limb motion/decreased step length/decreased carlos

## 2017-07-19 NOTE — PROGRESS NOTE ADULT - SUBJECTIVE AND OBJECTIVE BOX
THE PATIENT WAS SEEN AND EXAMINED BY ME WITH THE HOUSESTAFF AND STROKE TEAM DURING MORNING ROUNDS.   HPI:  Patient is an 88 year old female presented to the ED with right sided weakness for the past 2 weeks prior to admission. Patient has PMH of hypothyroidism, HTN, and GERD. As per patient, she described the weakness as a feeling of emptiness on the right upper and lower extremities with decreased sensation. She denied numbness or tingling. She almost fell 1 week prior which was when she told her daughter about the symptoms. Day of admission daughter called the PMD and was told to come to the ED. Patient at baseline ambulates with a cane and is able to eat and use the bathroom by herself.    SUBJECTIVE: No events overnight.  No new neurologic complaints.      atorvastatin 80 milliGRAM(s) Oral at bedtime  aspirin enteric coated 81 milliGRAM(s) Oral daily  losartan 25 milliGRAM(s) Oral daily  enoxaparin Injectable 30 milliGRAM(s) SubCutaneous every 24 hours  artificial  tears Solution 1 Drop(s) Both EYES every 12 hours PRN      PHYSICAL EXAM:   Vital Signs Last 24 Hrs  T(C): 36.8 (19 Jul 2017 07:35), Max: 36.8 (18 Jul 2017 23:38)  T(F): 98.3 (19 Jul 2017 07:35), Max: 98.3 (19 Jul 2017 07:35)  HR: 69 (19 Jul 2017 07:35) (61 - 74)  BP: 154/69 (19 Jul 2017 07:35) (123/75 - 178/77)  BP(mean): --  RR: 18 (19 Jul 2017 07:35) (18 - 18)  SpO2: 98% (19 Jul 2017 07:35) (97% - 98%)    General: No acute distress  HEENT: EOM intact, visual fields full  Abdomen: Soft, nontender, nondistended   Extremities: No edema    NEUROLOGICAL EXAM:  Mental status: Awake, alert, oriented x3, no aphasia, no neglect, normal memory   Cranial Nerves: No facial asymmetry, no nystagmus, no dysarthria, no dysphagia, tongue midline, shoulder shrug intact bilaterally.  Motor exam: Normal tone, no drift, 5/5 RUE, 5/5 RLE, 5/5 LUE, 5/5 LLE, normal fine finger movements.  Sensation: Intact to light touch   Coordination/ Gait: No dysmetria, JUAN MANUEL intact and symmetric bilaterally    LABS:                        13.1   7.48  )-----------( 147      ( 19 Jul 2017 07:32 )             40.9    07-19    140  |  105  |  30<H>  ----------------------------<  96  4.5   |  21<L>  |  1.14    Ca    9.2      19 Jul 2017 07:27    TPro  7.7  /  Alb  4.0  /  TBili  0.4  /  DBili  x   /  AST  26  /  ALT  11  /  AlkPhos  94  07-17  PT/INR - ( 17 Jul 2017 15:18 )   PT: 11.3 sec;   INR: 1.05 ratio         PTT - ( 17 Jul 2017 15:18 )  PTT:27.7 sec      IMAGING: Reviewed by me.     CT Head No Cont (07.17.17)   Brain CT: No acute hemorrhage, major distribution infarct or mass effect.  Cervical spine CT: Multilevel degenerative changes with moderate right   neural foraminal stenosis at C3-4 and C4-5 and bilaterally at C5-6.   Cervical spine MRI is recommended for further evaluation.  MRI Head w/o Cont (07.18.17 @ 10:21)   acute/subacute infarct of the posterior limb capsule with involvement   of a small portion of the globus pallidus.  Neck MRA: Significant stenosis.  Brain MRA: Motion limited study. Possible focal narrowing of the distal   right internal carotid artery. No evidence for occlusion. THE PATIENT WAS SEEN AND EXAMINED BY ME WITH THE HOUSESTAFF AND STROKE TEAM DURING MORNING ROUNDS.   HPI:  Patient is an 88 year old female presented to the ED with right sided weakness for the past 2 weeks prior to admission. Patient has PMH of hypothyroidism, HTN, and GERD. As per patient, she described the weakness as a feeling of emptiness on the right upper and lower extremities with decreased sensation. She denied numbness or tingling. She almost fell 1 week prior which was when she told her daughter about the symptoms. Day of admission daughter called the PMD and was told to come to the ED. Patient at baseline ambulates with a cane and is able to eat and use the bathroom by herself.    SUBJECTIVE: No events overnight.  No new neurologic complaints.      atorvastatin 80 milliGRAM(s) Oral at bedtime  aspirin enteric coated 81 milliGRAM(s) Oral daily  losartan 25 milliGRAM(s) Oral daily  enoxaparin Injectable 30 milliGRAM(s) SubCutaneous every 24 hours  artificial  tears Solution 1 Drop(s) Both EYES every 12 hours PRN      PHYSICAL EXAM:   Vital Signs Last 24 Hrs  T(C): 36.8 (19 Jul 2017 07:35), Max: 36.8 (18 Jul 2017 23:38)  T(F): 98.3 (19 Jul 2017 07:35), Max: 98.3 (19 Jul 2017 07:35)  HR: 69 (19 Jul 2017 07:35) (61 - 74)  BP: 154/69 (19 Jul 2017 07:35) (123/75 - 178/77)  BP(mean): --  RR: 18 (19 Jul 2017 07:35) (18 - 18)  SpO2: 98% (19 Jul 2017 07:35) (97% - 98%)    General: No acute distress  HEENT: EOM intact, visual fields full  Abdomen: Soft, nontender, nondistended   Extremities: No edema    NEUROLOGICAL EXAM:  Mental status: Awake, alert, oriented x3, no aphasia, no neglect, appears to have normal memory   Cranial Nerves: No facial asymmetry, no nystagmus, no dysarthria, no dysphagia, tongue midline, shoulder shrug intact bilaterally.  Motor exam: Normal tone, no drift, 5/5 RUE, 5/5 RLE, 5/5 LUE, 5/5 LLE, normal fine finger movements.  Sensation: Intact to light touch   Coordination/ Gait: No dysmetria, JUAN MANUEL intact and symmetric bilaterally    LABS:                        13.1   7.48  )-----------( 147      ( 19 Jul 2017 07:32 )             40.9    07-19    140  |  105  |  30<H>  ----------------------------<  96  4.5   |  21<L>  |  1.14    Ca    9.2      19 Jul 2017 07:27    TPro  7.7  /  Alb  4.0  /  TBili  0.4  /  DBili  x   /  AST  26  /  ALT  11  /  AlkPhos  94  07-17  PT/INR - ( 17 Jul 2017 15:18 )   PT: 11.3 sec;   INR: 1.05 ratio         PTT - ( 17 Jul 2017 15:18 )  PTT:27.7 sec      IMAGING: Reviewed by me.     CT Head No Cont (07.17.17)   Brain CT: No acute hemorrhage, major distribution infarct or mass effect.  Cervical spine CT: Multilevel degenerative changes with moderate right   neural foraminal stenosis at C3-4 and C4-5 and bilaterally at C5-6.   Cervical spine MRI is recommended for further evaluation.  MRI Head w/o Cont (07.18.17 @ 10:21)   acute/subacute infarct of the posterior limb capsule with involvement   of a small portion of the globus pallidus.  Neck MRA: Significant stenosis.  Brain MRA: Motion limited study. Possible focal narrowing of the distal   right internal carotid artery. No evidence for occlusion. THE PATIENT WAS SEEN AND EXAMINED BY ME WITH THE HOUSESTAFF AND STROKE TEAM DURING MORNING ROUNDS.   HPI:  Patient is an 88 year old female presented to the ED with right sided weakness for the past 2 weeks prior to admission. Patient has PMH of hypothyroidism, HTN, and GERD. As per patient, she described the weakness as a feeling of emptiness on the right upper and lower extremities with decreased sensation. She denied numbness or tingling. She almost fell 1 week prior which was when she told her daughter about the symptoms. Day of admission daughter called the PMD and was told to come to the ED. Patient at baseline ambulates with a cane and is able to eat and use the bathroom by herself.    SUBJECTIVE: No events overnight.  No new neurologic complaints.      atorvastatin 80 milliGRAM(s) Oral at bedtime  aspirin enteric coated 81 milliGRAM(s) Oral daily  losartan 25 milliGRAM(s) Oral daily  enoxaparin Injectable 30 milliGRAM(s) SubCutaneous every 24 hours  artificial  tears Solution 1 Drop(s) Both EYES every 12 hours PRN      PHYSICAL EXAM:   Vital Signs Last 24 Hrs  T(C): 36.8 (19 Jul 2017 07:35), Max: 36.8 (18 Jul 2017 23:38)  T(F): 98.3 (19 Jul 2017 07:35), Max: 98.3 (19 Jul 2017 07:35)  HR: 69 (19 Jul 2017 07:35) (61 - 74)  BP: 154/69 (19 Jul 2017 07:35) (123/75 - 178/77)  BP(mean): --  RR: 18 (19 Jul 2017 07:35) (18 - 18)  SpO2: 98% (19 Jul 2017 07:35) (97% - 98%)    General: No acute distress  HEENT: EOM intact, visual fields full  Abdomen: Soft, nontender, nondistended   Extremities: No edema    NEUROLOGICAL EXAM:  Mental status: Awake, alert, oriented x3, no aphasia, no neglect, appears to have normal memory   Cranial Nerves: No facial asymmetry, no nystagmus, no dysarthria, no dysphagia, tongue midline, shoulder shrug intact bilaterally.  Motor exam: Normal tone, no drift, 5-/5 RUE, 5/5 RLE, 5/5 LUE, 5/5 LLE, normal fine finger movements.  Sensation: Intact to light touch   Coordination/ Gait: No dysmetria, JUAN MANUEL intact and symmetric bilaterally    LABS:                        13.1   7.48  )-----------( 147      ( 19 Jul 2017 07:32 )             40.9    07-19    140  |  105  |  30<H>  ----------------------------<  96  4.5   |  21<L>  |  1.14    Ca    9.2      19 Jul 2017 07:27    TPro  7.7  /  Alb  4.0  /  TBili  0.4  /  DBili  x   /  AST  26  /  ALT  11  /  AlkPhos  94  07-17  PT/INR - ( 17 Jul 2017 15:18 )   PT: 11.3 sec;   INR: 1.05 ratio         PTT - ( 17 Jul 2017 15:18 )  PTT:27.7 sec      IMAGING: Reviewed by me.     CT Head No Cont (07.17.17)   Brain CT: No acute hemorrhage, major distribution infarct or mass effect.  Cervical spine CT: Multilevel degenerative changes with moderate right   neural foraminal stenosis at C3-4 and C4-5 and bilaterally at C5-6.   Cervical spine MRI is recommended for further evaluation.  MRI Head w/o Cont (07.18.17 @ 10:21)   acute/subacute infarct of the posterior limb capsule with involvement   of a small portion of the globus pallidus.  Neck MRA: Significant stenosis.  Brain MRA: Motion limited study. Possible focal narrowing of the distal   right internal carotid artery. No evidence for occlusion.

## 2017-07-19 NOTE — PHYSICAL THERAPY INITIAL EVALUATION ADULT - ADDITIONAL COMMENTS
Pt resides in apt with about 12 steps to enter with handrail Pt resides in apt with dtr & son in law, with about 12 steps to enter with handrail. PTA independent in mobility and ADL's. Pt was using std cane recently

## 2017-07-19 NOTE — OCCUPATIONAL THERAPY INITIAL EVALUATION ADULT - ADDITIONAL COMMENTS
MRI Head Or acute/subacute infarct of the posterior limb capsule with involvement of a small portion of the globus pallidus.Neck MRA: Significant stenosis. Brain MRA: Motion limited study. Possible focal narrowing of the distal right internal carotid artery. No evidence for occlusion.

## 2017-07-19 NOTE — PHYSICAL THERAPY INITIAL EVALUATION ADULT - PERTINENT HX OF CURRENT PROBLEM, REHAB EVAL
Pt is 88F admitted 7/17/17 presenting to ED w/right sided weakness for past 2 weeks. PMHx hypothyroidism, HTN,& GERD. As per patient, she describes the weakness as a feeling of emptiness on the right upper and lower extremities with decreased sensation.

## 2017-07-19 NOTE — OCCUPATIONAL THERAPY INITIAL EVALUATION ADULT - DIAGNOSIS, OT EVAL
Decreased coordination, balance, strength, endurance impacting ability to perform ADLs and functional mobility

## 2017-07-20 PROCEDURE — 99233 SBSQ HOSP IP/OBS HIGH 50: CPT

## 2017-07-20 RX ADMIN — ATORVASTATIN CALCIUM 80 MILLIGRAM(S): 80 TABLET, FILM COATED ORAL at 21:05

## 2017-07-20 RX ADMIN — LOSARTAN POTASSIUM 25 MILLIGRAM(S): 100 TABLET, FILM COATED ORAL at 05:30

## 2017-07-20 RX ADMIN — Medication 81 MILLIGRAM(S): at 11:55

## 2017-07-20 RX ADMIN — ENOXAPARIN SODIUM 30 MILLIGRAM(S): 100 INJECTION SUBCUTANEOUS at 21:05

## 2017-07-20 NOTE — PROGRESS NOTE ADULT - ASSESSMENT
ASSESSMENT: 88F with HTN, GERD who presents with right hemiparesis for 2 weeks. MRI shows a left thalamocapsular acute ischemic stroke, likely small vessel disease/lacunar etiology. Impression: Cerebral thrombosis with cerebral infarction secondary to HTN.     NEURO: neurologically without acute change, titration of SBP to normotension, titrate statin to LDL goal less than 70, MR imaging as noted above, Physical therapy/OT eval for SUNIL.    ANTITHROMBOTIC THERAPY: ASA    PULMONARY: CXR clear, protecting airway, saturating well     CARDIOVASCULAR: check TTE if able to be obtained as inpatient, otherwise may be completed with PMD                   SBP goal: normotension    GASTROINTESTINAL: dysphagia screen  passed, on regular diet and tolerating well      Diet: regular     RENAL:  encourage hydration, good urine output      Na Goal: Greater than 135     Veliz:n    HEMATOLOGY: no active bleeding     DVT ppx: Heparin s.c [] LMWH [x]     ID: afebrile, no si/sx of infection     OTHER: plan of care d/w family via telephone ([facility]).     DISPOSITION: D/C to [facility] once bed/auth in place, d/w case management.       CORE MEASURES:        Admission NIHSS:      TPA: [] YES [x] NO      LDL/HDL:110/33     Depression Screen: 0     Statin Therapy:y     Dysphagia Screen: [x] PASS [] FAIL     Smoking [] YES [x] NO      Afib [] YES [x] NO     Stroke Education [x] YES [] NO

## 2017-07-21 PROCEDURE — 93306 TTE W/DOPPLER COMPLETE: CPT | Mod: 26

## 2017-07-21 PROCEDURE — 99233 SBSQ HOSP IP/OBS HIGH 50: CPT

## 2017-07-21 RX ORDER — LOPERAMIDE HCL 2 MG
2 TABLET ORAL ONCE
Qty: 0 | Refills: 0 | Status: COMPLETED | OUTPATIENT
Start: 2017-07-21 | End: 2017-07-21

## 2017-07-21 RX ADMIN — ATORVASTATIN CALCIUM 80 MILLIGRAM(S): 80 TABLET, FILM COATED ORAL at 21:21

## 2017-07-21 RX ADMIN — ENOXAPARIN SODIUM 30 MILLIGRAM(S): 100 INJECTION SUBCUTANEOUS at 22:10

## 2017-07-21 RX ADMIN — Medication 2 MILLIGRAM(S): at 02:42

## 2017-07-21 RX ADMIN — Medication 81 MILLIGRAM(S): at 11:22

## 2017-07-21 RX ADMIN — LOSARTAN POTASSIUM 25 MILLIGRAM(S): 100 TABLET, FILM COATED ORAL at 05:41

## 2017-07-21 NOTE — PROGRESS NOTE ADULT - SUBJECTIVE AND OBJECTIVE BOX
THE PATIENT WAS SEEN AND EXAMINED BY ME WITH THE HOUSESTAFF AND STROKE TEAM DURING MORNING ROUNDS.   HPI: Patient is an 88 year old female presented to the ED with right sided weakness for the past 2 weeks prior to admission. Patient has PMH of hypothyroidism, HTN, and GERD. As per patient, she described the weakness as a feeling of emptiness on the right upper and lower extremities with decreased sensation. She denied numbness or tingling. She almost fell 1 week prior which was when she told her daughter about the symptoms. Day of admission daughter called the PMD and was told to come to the ED. Patient at baseline ambulates with a cane and is able to eat and use the bathroom by herself.      SUBJECTIVE: No events overnight.  No new neurologic complaints.      atorvastatin 80 milliGRAM(s) Oral at bedtime  aspirin enteric coated 81 milliGRAM(s) Oral daily  losartan 25 milliGRAM(s) Oral daily  enoxaparin Injectable 30 milliGRAM(s) SubCutaneous every 24 hours  artificial  tears Solution 1 Drop(s) Both EYES every 12 hours PRN      PHYSICAL EXAM:   Vital Signs Last 24 Hrs  T(C): 37 (21 Jul 2017 05:32), Max: 37 (21 Jul 2017 05:32)  T(F): 98.6 (21 Jul 2017 05:32), Max: 98.6 (21 Jul 2017 05:32)  HR: 109 (21 Jul 2017 05:32) (97 - 109)  BP: 102/67 (21 Jul 2017 05:32) (102/67 - 154/90)  BP(mean): --  RR: 18 (21 Jul 2017 05:32) (18 - 18)  SpO2: 97% (21 Jul 2017 05:32) (95% - 97%)     General: No acute distress  HEENT: EOM intact, visual fields full  Abdomen: Soft, nontender, nondistended   Extremities: No edema    NEUROLOGICAL EXAM:  Mental status: Awake, alert, oriented x3, no aphasia, no neglect, appears to have normal memory   Cranial Nerves: No facial asymmetry, no nystagmus, no dysarthria, no dysphagia, tongue midline, shoulder shrug intact bilaterally.  Motor exam: Normal tone, no drift, 5-/5 RUE, 5/5 RLE, 5/5 LUE, 5/5 LLE, normal fine finger movements.  Sensation: Intact to light touch   Coordination/ Gait: No dysmetria, JUAN MANUEL intact and symmetric bilaterally    LABS:         Hemoglobin A1C, Whole Blood: 5.7 % (07-19 @ 07:32)      IMAGING: Reviewed by me. THE PATIENT WAS SEEN AND EXAMINED BY ME WITH THE HOUSESTAFF AND STROKE TEAM DURING MORNING ROUNDS.   HPI: Patient is an 88 year old female presented to the ED with right sided weakness for the past 2 weeks prior to admission. Patient has PMH of hypothyroidism, HTN, and GERD. As per patient, she described the weakness as a feeling of emptiness on the right upper and lower extremities with decreased sensation. She denied numbness or tingling. She almost fell 1 week prior which was when she told her daughter about the symptoms. Day of admission daughter called the PMD and was told to come to the ED. Patient at baseline ambulates with a cane and is able to eat and use the bathroom by herself.      SUBJECTIVE: No events overnight.  No new neurologic complaints.      atorvastatin 80 milliGRAM(s) Oral at bedtime  aspirin enteric coated 81 milliGRAM(s) Oral daily  losartan 25 milliGRAM(s) Oral daily  enoxaparin Injectable 30 milliGRAM(s) SubCutaneous every 24 hours  artificial  tears Solution 1 Drop(s) Both EYES every 12 hours PRN    PHYSICAL EXAM:   Vital Signs Last 24 Hrs  T(C): 37 (21 Jul 2017 05:32), Max: 37 (21 Jul 2017 05:32)  T(F): 98.6 (21 Jul 2017 05:32), Max: 98.6 (21 Jul 2017 05:32)  HR: 109 (21 Jul 2017 05:32) (97 - 109)  BP: 102/67 (21 Jul 2017 05:32) (102/67 - 154/90)  BP(mean): --  RR: 18 (21 Jul 2017 05:32) (18 - 18)  SpO2: 97% (21 Jul 2017 05:32) (95% - 97%)     General: No acute distress  HEENT: EOM intact, visual fields full  Abdomen: Soft, nontender, nondistended   Extremities: No edema    NEUROLOGICAL EXAM:  Mental status: Awake, alert, oriented x3, no aphasia, no neglect, appears to have normal memory   Cranial Nerves: No facial asymmetry, no nystagmus, no dysarthria, no dysphagia, tongue midline, shoulder shrug intact bilaterally.  Motor exam: Normal tone, no drift, 5-/5 RUE, 5/5 RLE, 5/5 LUE, 5/5 LLE, normal fine finger movements.  Sensation: Intact to light touch   Coordination/ Gait: No dysmetria, JUAN MANUEL intact and symmetric bilaterally    LABS:    Hemoglobin A1C, Whole Blood: 5.7 % (07-19 @ 07:32)      IMAGING: Reviewed by me. None.

## 2017-07-21 NOTE — PROVIDER CONTACT NOTE (OTHER) - ASSESSMENT
phone utilized. Patient A&Ox3, disoriented to time, upper/lower no drifts, limb ataxia present upper extremities, no c/o of pain.

## 2017-07-21 NOTE — PROGRESS NOTE ADULT - ASSESSMENT
ASSESSMENT: 88F with HTN, GERD who presents with right hemiparesis for 2 weeks. MRI shows a left thalamocapsular acute ischemic stroke, likely small vessel disease/lacunar etiology. Impression: Cerebral thrombosis with cerebral infarction secondary to HTN.     NEURO: neurologically without acute change, titration of SBP to normotension, titrate statin to LDL goal less than 70, MR imaging as noted above, Physical therapy/OT eval for SUNIL.    ANTITHROMBOTIC THERAPY: ASA    PULMONARY: CXR clear, protecting airway, saturating well     CARDIOVASCULAR: check TTE if able to be obtained as inpatient, otherwise may be completed with PMD                   SBP goal: normotension    GASTROINTESTINAL: dysphagia screen  passed, on regular diet and tolerating well      Diet: regular     RENAL:  encourage hydration, good urine output      Na Goal: Greater than 135     Veliz:n    HEMATOLOGY: no active bleeding     DVT ppx: Heparin s.c [] LMWH [x]     ID: afebrile, no si/sx of infection     OTHER: plan of care d/w family via telephone ([facility]).     DISPOSITION: D/C to [facility] once bed/auth in place, d/w case management.       CORE MEASURES:        Admission NIHSS:      TPA: [] YES [x] NO      LDL/HDL:110/33     Depression Screen: 0     Statin Therapy:y     Dysphagia Screen: [x] PASS [] FAIL     Smoking [] YES [x] NO      Afib [] YES [x] NO     Stroke Education [x] YES [] NO

## 2017-07-22 PROCEDURE — 99233 SBSQ HOSP IP/OBS HIGH 50: CPT

## 2017-07-22 RX ADMIN — ENOXAPARIN SODIUM 30 MILLIGRAM(S): 100 INJECTION SUBCUTANEOUS at 22:22

## 2017-07-22 RX ADMIN — ATORVASTATIN CALCIUM 80 MILLIGRAM(S): 80 TABLET, FILM COATED ORAL at 22:22

## 2017-07-22 RX ADMIN — Medication 81 MILLIGRAM(S): at 11:12

## 2017-07-22 RX ADMIN — LOSARTAN POTASSIUM 25 MILLIGRAM(S): 100 TABLET, FILM COATED ORAL at 06:42

## 2017-07-22 NOTE — PROGRESS NOTE ADULT - ASSESSMENT
ASSESSMENT: 88F with HTN, GERD who presents with right hemiparesis for 2 weeks. MRI shows a left thalamocapsular acute ischemic stroke, likely small vessel disease/lacunar etiology. Impression: Cerebral thrombosis with cerebral infarction secondary to HTN.     NEURO: neurologically without acute change, titration of SBP to normotension, titrate statin to LDL goal less than 70, MR imaging as noted above, Physical therapy/OT eval for SUNIL.    ANTITHROMBOTIC THERAPY: ASA    PULMONARY: CXR clear, protecting airway, saturating well     CARDIOVASCULAR: 7/21 TTE  (07.21.17 @ 16:09) >1. Increased relative wall thickness with normal leftventricular mass index, consistent with concentric left  ventricular remodeling.  2. Hyperdynamic left ventricle.  Telemetry without event.         SBP goal: normotension    GASTROINTESTINAL: dysphagia screen  passed, on regular diet and tolerating well      Diet: regular     RENAL:  encourage hydration, good urine output      Na Goal: Greater than 135     Veliz:n    HEMATOLOGY: no active bleeding     DVT ppx: Heparin s.c [] LMWH [x]     ID: afebrile, no si/sx of infection     OTHER: plan of care d/w family via telephone ([facility]).     DISPOSITION: D/C to [facility] once bed/auth in place, d/w case management.       CORE MEASURES:        Admission NIHSS:      TPA: [] YES [x] NO      LDL/HDL:110/33     Depression Screen: 0     Statin Therapy:y     Dysphagia Screen: [x] PASS [] FAIL     Smoking [] YES [x] NO      Afib [] YES [x] NO     Stroke Education [x] YES [] NO

## 2017-07-22 NOTE — PROGRESS NOTE ADULT - SUBJECTIVE AND OBJECTIVE BOX
THE PATIENT WAS SEEN AND EXAMINED BY ME WITH THE HOUSESTAFF AND STROKE TEAM DURING MORNING ROUNDS.   HPI:  Patient is an 88 year old female presenting to the ED with right sided weakness for the past 2 weeks. Patient has PMH of hypothyroidism, HTN, and GERD. As per patient, she describes the weakness as a feeling of emptiness on the right upper and lower extremities with decreased sensation. She denies numbness or tingling. She almost fell 1 week ago which was when she told her daughter about the symptoms. Today her daughter called the PMD and was told to come to the ED. Patient at baseline ambulates with a cane and is able to eat and use the bathroom by herself. Family is unable to help her up and down the stairs at home.       SUBJECTIVE: No events overnight.  No new neurologic complaints.      atorvastatin 80 milliGRAM(s) Oral at bedtime  aspirin enteric coated 81 milliGRAM(s) Oral daily  losartan 25 milliGRAM(s) Oral daily  enoxaparin Injectable 30 milliGRAM(s) SubCutaneous every 24 hours  artificial  tears Solution 1 Drop(s) Both EYES every 12 hours PRN      PHYSICAL EXAM:   Vital Signs Last 24 Hrs  T(C): 37.2 (22 Jul 2017 16:01), Max: 37.2 (22 Jul 2017 16:01)  T(F): 98.9 (22 Jul 2017 16:01), Max: 98.9 (22 Jul 2017 16:01)  HR: 94 (22 Jul 2017 16:01) (83 - 108)  BP: 144/82 (22 Jul 2017 16:01) (102/61 - 156/79)  BP(mean): --  RR: 18 (22 Jul 2017 16:01) (18 - 18)  SpO2: 98% (22 Jul 2017 16:01) (94% - 98%)    General: No acute distress  HEENT: EOM intact, visual fields full  Abdomen: Soft, nontender, nondistended   Extremities: No edema    NEUROLOGICAL EXAM:  Mental status: Awake, alert, oriented x3, no aphasia, no neglect, normal memory   Cranial Nerves: No facial asymmetry, no nystagmus, no dysarthria, no dysphagia, tongue midline, shoulder shrug intact bilaterally.  Motor exam: Normal tone, no drift, 5/5 RUE, 5/5 RLE, 5/5 LUE, 5/5 LLE, normal fine finger movements.  Sensation: Intact to light touch   Coordination/ Gait: No dysmetria, JUAN MANUEL intact and symmetric bilaterally, on bedrest     LABS:         Hemoglobin A1C, Whole Blood: 5.7 % (07-19 @ 07:32)      IMAGING: Reviewed by me.   CT Head No Cont (07.17.17)   Brain CT: No acute hemorrhage, major distribution infarct or mass effect.  Cervical spine CT: Multilevel degenerative changes with moderate right   neural foraminal stenosis at C3-4 and C4-5 and bilaterally at C5-6.   Cervical spine MRI is recommended for further evaluation.  MRI Head w/o Cont (07.18.17 @ 10:21)   acute/subacute infarct of the posterior limb capsule with involvement   of a small portion of the globus pallidus.  Neck MRA: Significant stenosis.  Brain MRA: Motion limited study. Possible focal narrowing of the distal   right internal carotid artery. No evidence for occlusion.

## 2017-07-23 PROCEDURE — 99233 SBSQ HOSP IP/OBS HIGH 50: CPT

## 2017-07-23 RX ADMIN — LOSARTAN POTASSIUM 25 MILLIGRAM(S): 100 TABLET, FILM COATED ORAL at 06:07

## 2017-07-23 RX ADMIN — ENOXAPARIN SODIUM 30 MILLIGRAM(S): 100 INJECTION SUBCUTANEOUS at 21:35

## 2017-07-23 RX ADMIN — ATORVASTATIN CALCIUM 80 MILLIGRAM(S): 80 TABLET, FILM COATED ORAL at 21:35

## 2017-07-23 RX ADMIN — Medication 81 MILLIGRAM(S): at 11:30

## 2017-07-23 NOTE — PROGRESS NOTE ADULT - ASSESSMENT
ASSESSMENT: 88F with HTN, GERD who presents with right hemiparesis for 2 weeks. MRI shows a left thalamocapsular acute ischemic stroke, likely small vessel disease/lacunar etiology. Impression: Cerebral thrombosis with cerebral infarction secondary to HTN.     NEURO: neurologically without acute change, titration of SBP to normotension, titrate statin to LDL goal less than 70, MR imaging as noted above, Physical therapy/OT eval for SUNIL.    ANTITHROMBOTIC THERAPY: ASA    PULMONARY: CXR clear, protecting airway, saturating well     CARDIOVASCULAR: 7/21 TTE  (07.21.17 @ 16:09) >1. Increased relative wall thickness with normal leftventricular mass index, consistent with concentric left  ventricular remodeling.  2. Hyperdynamic left ventricle.  Telemetry without event.         SBP goal: normotension    GASTROINTESTINAL: dysphagia screen  passed, on regular diet and tolerating well      Diet: regular     RENAL:  encourage hydration, good urine output      Na Goal: Greater than 135     Veliz:n    HEMATOLOGY: no active bleeding     DVT ppx: Heparin s.c [] LMWH [x]     ID: afebrile, no si/sx of infection     OTHER: plan of care d/w family via telephone (Cintia).     DISPOSITION: D/C to [facility] once bed/auth in place, d/w case management.       CORE MEASURES:        Admission NIHSS:      TPA: [] YES [x] NO      LDL/HDL:110/33     Depression Screen: 0     Statin Therapy:y     Dysphagia Screen: [x] PASS [] FAIL     Smoking [] YES [x] NO      Afib [] YES [x] NO     Stroke Education [x] YES [] NO

## 2017-07-23 NOTE — PROGRESS NOTE ADULT - SUBJECTIVE AND OBJECTIVE BOX
THE PATIENT WAS SEEN AND EXAMINED BY ME WITH THE HOUSESTAFF AND STROKE TEAM DURING MORNING ROUNDS.   HPI:  Patient is an 88 year old female presenting to the ED with right sided weakness for the past 2 weeks. Patient has PMH of hypothyroidism, HTN, and GERD. As per patient, she describes the weakness as a feeling of emptiness on the right upper and lower extremities with decreased sensation. She denies numbness or tingling. She almost fell 1 week ago which was when she told her daughter about the symptoms. Today her daughter called the PMD and was told to come to the ED. Patient at baseline ambulates with a cane and is able to eat and use the bathroom by herself. Family is unable to help her up and down the stairs at home.       SUBJECTIVE: No events overnight.  No new neurologic complaints.      atorvastatin 80 milliGRAM(s) Oral at bedtime  aspirin enteric coated 81 milliGRAM(s) Oral daily  losartan 25 milliGRAM(s) Oral daily  enoxaparin Injectable 30 milliGRAM(s) SubCutaneous every 24 hours  artificial  tears Solution 1 Drop(s) Both EYES every 12 hours PRN      PHYSICAL EXAM:   Vital Signs Last 24 Hrs  T(C): 36.8 (23 Jul 2017 04:43), Max: 37.2 (22 Jul 2017 16:01)  T(F): 98.2 (23 Jul 2017 04:43), Max: 98.9 (22 Jul 2017 16:01)  HR: 107 (23 Jul 2017 04:43) (89 - 108)  BP: 113/69 (23 Jul 2017 04:43) (113/69 - 144/82)  BP(mean): --  RR: 18 (23 Jul 2017 04:43) (18 - 20)  SpO2: 96% (23 Jul 2017 04:43) (96% - 98%)    General: No acute distress  HEENT: EOM intact, visual fields full  Abdomen: Soft, nontender, nondistended   Extremities: No edema    NEUROLOGICAL EXAM:  Mental status: Awake, alert, oriented x3, no aphasia, no neglect, normal memory   Cranial Nerves: No facial asymmetry, no nystagmus, no dysarthria, no dysphagia, tongue midline, shoulder shrug intact bilaterally.  Motor exam: Normal tone, no drift, 5/5 RUE, 5/5 RLE, 5/5 LUE, 5/5 LLE, normal fine finger movements.  Sensation: Intact to light touch   Coordination/ Gait: No dysmetria, JUAN MANUEL intact and symmetric bilaterally, on bedrest     LABS:         Hemoglobin A1C, Whole Blood: 5.7 % (07-19 @ 07:32)      IMAGING: Reviewed by me.   CT Head No Cont (07.17.17)   Brain CT: No acute hemorrhage, major distribution infarct or mass effect.  Cervical spine CT: Multilevel degenerative changes with moderate right   neural foraminal stenosis at C3-4 and C4-5 and bilaterally at C5-6.   Cervical spine MRI is recommended for further evaluation.  MRI Head w/o Cont (07.18.17 @ 10:21)   acute/subacute infarct of the posterior limb capsule with involvement   of a small portion of the globus pallidus.  Neck MRA: Significant stenosis.  Brain MRA: Motion limited study. Possible focal narrowing of the distal   right internal carotid artery. No evidence for occlusion.

## 2017-07-24 VITALS
SYSTOLIC BLOOD PRESSURE: 133 MMHG | HEART RATE: 99 BPM | RESPIRATION RATE: 17 BRPM | TEMPERATURE: 98 F | DIASTOLIC BLOOD PRESSURE: 80 MMHG | OXYGEN SATURATION: 97 %

## 2017-07-24 PROCEDURE — 93306 TTE W/DOPPLER COMPLETE: CPT

## 2017-07-24 PROCEDURE — 97116 GAIT TRAINING THERAPY: CPT

## 2017-07-24 PROCEDURE — 97166 OT EVAL MOD COMPLEX 45 MIN: CPT

## 2017-07-24 PROCEDURE — 85730 THROMBOPLASTIN TIME PARTIAL: CPT

## 2017-07-24 PROCEDURE — 80048 BASIC METABOLIC PNL TOTAL CA: CPT

## 2017-07-24 PROCEDURE — 70450 CT HEAD/BRAIN W/O DYE: CPT

## 2017-07-24 PROCEDURE — 97535 SELF CARE MNGMENT TRAINING: CPT

## 2017-07-24 PROCEDURE — 97162 PT EVAL MOD COMPLEX 30 MIN: CPT

## 2017-07-24 PROCEDURE — 80061 LIPID PANEL: CPT

## 2017-07-24 PROCEDURE — 71045 X-RAY EXAM CHEST 1 VIEW: CPT

## 2017-07-24 PROCEDURE — 99285 EMERGENCY DEPT VISIT HI MDM: CPT | Mod: 25

## 2017-07-24 PROCEDURE — 70544 MR ANGIOGRAPHY HEAD W/O DYE: CPT

## 2017-07-24 PROCEDURE — 85027 COMPLETE CBC AUTOMATED: CPT

## 2017-07-24 PROCEDURE — 80053 COMPREHEN METABOLIC PANEL: CPT

## 2017-07-24 PROCEDURE — 93005 ELECTROCARDIOGRAM TRACING: CPT

## 2017-07-24 PROCEDURE — 83036 HEMOGLOBIN GLYCOSYLATED A1C: CPT

## 2017-07-24 PROCEDURE — 72125 CT NECK SPINE W/O DYE: CPT

## 2017-07-24 PROCEDURE — 70551 MRI BRAIN STEM W/O DYE: CPT

## 2017-07-24 PROCEDURE — 70547 MR ANGIOGRAPHY NECK W/O DYE: CPT

## 2017-07-24 PROCEDURE — 85610 PROTHROMBIN TIME: CPT

## 2017-07-24 PROCEDURE — 97110 THERAPEUTIC EXERCISES: CPT

## 2017-07-24 RX ORDER — ATORVASTATIN CALCIUM 80 MG/1
1 TABLET, FILM COATED ORAL
Qty: 0 | Refills: 0 | COMMUNITY
Start: 2017-07-24

## 2017-07-24 RX ADMIN — LOSARTAN POTASSIUM 25 MILLIGRAM(S): 100 TABLET, FILM COATED ORAL at 05:54

## 2017-07-24 RX ADMIN — Medication 81 MILLIGRAM(S): at 11:32

## 2017-07-24 NOTE — PROGRESS NOTE ADULT - SUBJECTIVE AND OBJECTIVE BOX
THE PATIENT WAS SEEN AND EXAMINED BY ME WITH THE HOUSESTAFF AND STROKE TEAM DURING MORNING ROUNDS.   HPI:  Patient is an 88 year old female presenting to the ED with right sided weakness for the past 2 weeks. Patient has PMH of hypothyroidism, HTN, and GERD. As per patient, she described the weakness as a feeling of emptiness on the right upper and lower extremities with decreased sensation. She denied numbness or tingling. She almost fell 1 week prior which was when she told her daughter about the symptoms. Day of admission her daughter called the PMD and was told to come to the ED. Patient at baseline ambulates with a cane and is able to eat and use the bathroom by herself. Family is unable to help her up and down the stairs at home.    SUBJECTIVE: No events overnight.  No new neurologic complaints.      atorvastatin 80 milliGRAM(s) Oral at bedtime  aspirin enteric coated 81 milliGRAM(s) Oral daily  losartan 25 milliGRAM(s) Oral daily  enoxaparin Injectable 30 milliGRAM(s) SubCutaneous every 24 hours  artificial  tears Solution 1 Drop(s) Both EYES every 12 hours PRN      PHYSICAL EXAM:   Vital Signs Last 24 Hrs  T(C): 36.7 (24 Jul 2017 12:15), Max: 37.4 (23 Jul 2017 21:59)  T(F): 98.1 (24 Jul 2017 12:15), Max: 99.4 (23 Jul 2017 21:59)  HR: 97 (24 Jul 2017 12:15) (83 - 97)  BP: 125/66 (24 Jul 2017 12:15) (124/74 - 154/84)  BP(mean): --  RR: 18 (24 Jul 2017 12:15) (18 - 20)  SpO2: 97% (24 Jul 2017 12:15) (96% - 98%)    General: No acute distress  HEENT: EOM intact, visual fields full  Abdomen: Soft, nontender, nondistended   Extremities: No edema    NEUROLOGICAL EXAM:  Mental status: Awake, alert, oriented x3, no aphasia, no neglect, normal memory   Cranial Nerves: No facial asymmetry, no nystagmus, no dysarthria, no dysphagia, tongue midline, shoulder shrug intact bilaterally.  Motor exam: Normal tone, no drift, 5/5 RUE, 5/5 RLE, 5/5 LUE, 5/5 LLE, normal fine finger movements.  Sensation: Intact to light touch   Coordination/ Gait: No dysmetria, JUAN MANUEL intact and symmetric bilaterally,    LABS:    Hemoglobin A1C, Whole Blood: 5.7 % (07-19 @ 07:32)    IMAGING: Reviewed by me.   CT Head No Cont (07.17.17)   Brain CT: No acute hemorrhage, major distribution infarct or mass effect.  Cervical spine CT: Multilevel degenerative changes with moderate right   neural foraminal stenosis at C3-4 and C4-5 and bilaterally at C5-6.   Cervical spine MRI is recommended for further evaluation.  MRI Head w/o Cont (07.18.17 @ 10:21)   acute/subacute infarct of the posterior limb capsule with involvement   of a small portion of the globus pallidus.  Neck MRA: Significant stenosis.  Brain MRA: Motion limited study. Possible focal narrowing of the distal   right internal carotid artery. No evidence for occlusion.

## 2017-07-24 NOTE — PROGRESS NOTE ADULT - ASSESSMENT
ASSESSMENT: 88F with HTN, GERD who presents with right hemiparesis for 2 weeks. MRI shows a left thalamocapsular acute ischemic stroke, likely small vessel disease/lacunar etiology. Impression: Cerebral thrombosis with cerebral infarction secondary to HTN.     NEURO: neurologically without acute change, titration of SBP to normotension, titrate statin to LDL goal less than 70, MR imaging as noted above, Physical therapy/OT eval for SUNIL.    ANTITHROMBOTIC THERAPY: ASA    PULMONARY: CXR clear, protecting airway, saturating well     CARDIOVASCULAR: 7/21 TTE  (07.21.17 @ 16:09) >1. Increased relative wall thickness with normal leftventricular mass index, consistent with concentric leftventricular remodeling.  2. Hyperdynamic left ventricle.  Telemetry without event.         SBP goal: normotension    GASTROINTESTINAL: dysphagia screen  passed, on regular diet and tolerating well      Diet: regular     RENAL:  encourage hydration, good urine output      Na Goal: Greater than 135     Veliz:n    HEMATOLOGY: no active bleeding     DVT ppx: Heparin s.c [] LMWH [x]     ID: afebrile, no si/sx of infection     OTHER: plan of care d/w family via telephone (Cintia).     DISPOSITION: D/C to [facility] once bed/auth in place, d/w case management.       CORE MEASURES:        Admission NIHSS:      TPA: [] YES [x] NO      LDL/HDL:110/33     Depression Screen: 0     Statin Therapy:y     Dysphagia Screen: [x] PASS [] FAIL     Smoking [] YES [x] NO      Afib [] YES [x] NO     Stroke Education [x] YES [] NO

## 2017-07-26 PROBLEM — E03.9 HYPOTHYROIDISM, UNSPECIFIED: Chronic | Status: ACTIVE | Noted: 2017-07-17

## 2017-08-02 ENCOUNTER — APPOINTMENT (OUTPATIENT)
Dept: NEUROLOGY | Facility: CLINIC | Age: 82
End: 2017-08-02
Payer: MEDICAID

## 2017-08-02 VITALS — SYSTOLIC BLOOD PRESSURE: 170 MMHG | DIASTOLIC BLOOD PRESSURE: 74 MMHG | HEART RATE: 59 BPM

## 2017-08-02 PROCEDURE — 99215 OFFICE O/P EST HI 40 MIN: CPT

## 2017-08-02 PROCEDURE — 93880 EXTRACRANIAL BILAT STUDY: CPT

## 2017-09-03 ENCOUNTER — RX RENEWAL (OUTPATIENT)
Age: 82
End: 2017-09-03

## 2017-09-21 ENCOUNTER — APPOINTMENT (OUTPATIENT)
Dept: INTERNAL MEDICINE | Facility: CLINIC | Age: 82
End: 2017-09-21
Payer: MEDICAID

## 2017-09-21 ENCOUNTER — NON-APPOINTMENT (OUTPATIENT)
Age: 82
End: 2017-09-21

## 2017-09-21 VITALS — DIASTOLIC BLOOD PRESSURE: 80 MMHG | SYSTOLIC BLOOD PRESSURE: 140 MMHG

## 2017-09-21 VITALS
HEART RATE: 69 BPM | RESPIRATION RATE: 12 BRPM | DIASTOLIC BLOOD PRESSURE: 78 MMHG | BODY MASS INDEX: 31.7 KG/M2 | TEMPERATURE: 98.5 F | HEIGHT: 55 IN | WEIGHT: 137 LBS | SYSTOLIC BLOOD PRESSURE: 193 MMHG | OXYGEN SATURATION: 97 %

## 2017-09-21 DIAGNOSIS — R73.09 OTHER ABNORMAL GLUCOSE: ICD-10-CM

## 2017-09-21 PROCEDURE — 99215 OFFICE O/P EST HI 40 MIN: CPT

## 2017-09-21 PROCEDURE — 93000 ELECTROCARDIOGRAM COMPLETE: CPT

## 2017-09-21 RX ORDER — POLYVINYL ALCOHOL 14 MG/ML
1.4 SOLUTION/ DROPS OPHTHALMIC
Qty: 15 | Refills: 0 | Status: DISCONTINUED | COMMUNITY
Start: 2017-09-11

## 2017-09-21 RX ORDER — ACETAMINOPHEN 325 MG/1
325 TABLET ORAL
Qty: 60 | Refills: 0 | Status: DISCONTINUED | COMMUNITY
Start: 2017-09-11

## 2017-11-10 ENCOUNTER — APPOINTMENT (OUTPATIENT)
Dept: NEUROLOGY | Facility: CLINIC | Age: 82
End: 2017-11-10

## 2017-11-21 ENCOUNTER — APPOINTMENT (OUTPATIENT)
Dept: NEUROLOGY | Facility: CLINIC | Age: 82
End: 2017-11-21
Payer: MEDICAID

## 2017-11-21 VITALS
HEIGHT: 55 IN | SYSTOLIC BLOOD PRESSURE: 176 MMHG | BODY MASS INDEX: 31.94 KG/M2 | DIASTOLIC BLOOD PRESSURE: 72 MMHG | HEART RATE: 62 BPM | WEIGHT: 138 LBS

## 2017-11-21 DIAGNOSIS — Z87.891 PERSONAL HISTORY OF NICOTINE DEPENDENCE: ICD-10-CM

## 2017-11-21 PROCEDURE — 99215 OFFICE O/P EST HI 40 MIN: CPT

## 2017-11-22 ENCOUNTER — APPOINTMENT (OUTPATIENT)
Dept: INTERNAL MEDICINE | Facility: CLINIC | Age: 82
End: 2017-11-22
Payer: MEDICAID

## 2017-11-22 VITALS
OXYGEN SATURATION: 95 % | SYSTOLIC BLOOD PRESSURE: 170 MMHG | BODY MASS INDEX: 31.47 KG/M2 | RESPIRATION RATE: 12 BRPM | HEIGHT: 55 IN | HEART RATE: 68 BPM | TEMPERATURE: 98.1 F | WEIGHT: 136 LBS | DIASTOLIC BLOOD PRESSURE: 74 MMHG

## 2017-11-22 VITALS — SYSTOLIC BLOOD PRESSURE: 160 MMHG | DIASTOLIC BLOOD PRESSURE: 80 MMHG

## 2017-11-22 PROCEDURE — 90471 IMMUNIZATION ADMIN: CPT

## 2017-11-22 PROCEDURE — 90686 IIV4 VACC NO PRSV 0.5 ML IM: CPT

## 2017-11-22 PROCEDURE — 99214 OFFICE O/P EST MOD 30 MIN: CPT | Mod: 25

## 2017-11-26 LAB
25(OH)D3 SERPL-MCNC: 32.7 NG/ML
ALBUMIN SERPL ELPH-MCNC: 3.9 G/DL
ALP BLD-CCNC: 99 U/L
ALT SERPL-CCNC: 19 U/L
ANION GAP SERPL CALC-SCNC: 15 MMOL/L
APPEARANCE: CLEAR
AST SERPL-CCNC: 25 U/L
BASOPHILS # BLD AUTO: 0.02 K/UL
BASOPHILS NFR BLD AUTO: 0.3 %
BILIRUB SERPL-MCNC: 0.4 MG/DL
BILIRUBIN URINE: NEGATIVE
BLOOD URINE: NEGATIVE
BUN SERPL-MCNC: 32 MG/DL
CALCIUM SERPL-MCNC: 9.7 MG/DL
CHLORIDE SERPL-SCNC: 108 MMOL/L
CHOLEST SERPL-MCNC: 120 MG/DL
CHOLEST/HDLC SERPL: 3.3 RATIO
CO2 SERPL-SCNC: 20 MMOL/L
COLOR: YELLOW
CREAT SERPL-MCNC: 1.24 MG/DL
EOSINOPHIL # BLD AUTO: 0.17 K/UL
EOSINOPHIL NFR BLD AUTO: 2.4
GLUCOSE QUALITATIVE U: NEGATIVE MG/DL
GLUCOSE SERPL-MCNC: 87 MG/DL
HBA1C MFR BLD HPLC: 5.8 %
HCT VFR BLD CALC: 40.8 %
HDLC SERPL-MCNC: 36 MG/DL
HGB BLD-MCNC: 12.6 G/DL
IMM GRANULOCYTES NFR BLD AUTO: 0.1 %
KETONES URINE: NEGATIVE
LDLC SERPL CALC-MCNC: 62 MG/DL
LEUKOCYTE ESTERASE URINE: NEGATIVE
LYMPHOCYTES # BLD AUTO: 2.11 K/UL
LYMPHOCYTES NFR BLD AUTO: 29.5 %
MAGNESIUM SERPL-MCNC: 1.9 MG/DL
MAN DIFF?: NORMAL
MCHC RBC-ENTMCNC: 27.3 PG
MCHC RBC-ENTMCNC: 30.9 GM/DL
MCV RBC AUTO: 88.5 FL
MONOCYTES # BLD AUTO: 0.7 K/UL
MONOCYTES NFR BLD AUTO: 9.8 %
NEUTROPHILS # BLD AUTO: 4.14 K/UL
NEUTROPHILS NFR BLD AUTO: 57.9 %
NITRITE URINE: NEGATIVE
PH URINE: 5
PLATELET # BLD AUTO: 157 K/UL
POTASSIUM SERPL-SCNC: 5.3 MMOL/L
PROT SERPL-MCNC: 7.9 G/DL
PROTEIN URINE: NEGATIVE MG/DL
RBC # BLD: 4.61 M/UL
RBC # FLD: 15.2 %
SODIUM SERPL-SCNC: 143 MMOL/L
SPECIFIC GRAVITY URINE: 1.02
TRIGL SERPL-MCNC: 111 MG/DL
TSH SERPL-ACNC: 0.82 UIU/ML
UROBILINOGEN URINE: NEGATIVE MG/DL
WBC # FLD AUTO: 7.15 K/UL

## 2017-11-27 ENCOUNTER — RX RENEWAL (OUTPATIENT)
Age: 82
End: 2017-11-27

## 2017-12-31 ENCOUNTER — RX RENEWAL (OUTPATIENT)
Age: 82
End: 2017-12-31

## 2018-06-25 ENCOUNTER — APPOINTMENT (OUTPATIENT)
Dept: INTERNAL MEDICINE | Facility: CLINIC | Age: 83
End: 2018-06-25
Payer: MEDICAID

## 2018-06-25 VITALS
RESPIRATION RATE: 12 BRPM | DIASTOLIC BLOOD PRESSURE: 71 MMHG | HEART RATE: 69 BPM | HEIGHT: 55 IN | TEMPERATURE: 98.8 F | BODY MASS INDEX: 30.32 KG/M2 | WEIGHT: 131 LBS | OXYGEN SATURATION: 95 % | SYSTOLIC BLOOD PRESSURE: 184 MMHG

## 2018-06-25 VITALS — DIASTOLIC BLOOD PRESSURE: 80 MMHG | SYSTOLIC BLOOD PRESSURE: 130 MMHG

## 2018-06-25 DIAGNOSIS — R26.81 UNSTEADINESS ON FEET: ICD-10-CM

## 2018-06-25 DIAGNOSIS — I67.9 CEREBROVASCULAR DISEASE, UNSPECIFIED: ICD-10-CM

## 2018-06-25 PROCEDURE — 99215 OFFICE O/P EST HI 40 MIN: CPT

## 2018-06-25 RX ORDER — ASPIRIN ENTERIC COATED TABLETS 81 MG 81 MG/1
81 TABLET, DELAYED RELEASE ORAL
Qty: 30 | Refills: 0 | Status: ACTIVE | COMMUNITY
Start: 2017-09-11

## 2018-06-25 RX ORDER — PNEUMOCOCCAL 13-VALENT CONJUGATE VACCINE 2.2; 2.2; 2.2; 2.2; 2.2; 4.4; 2.2; 2.2; 2.2; 2.2; 2.2; 2.2; 2.2 UG/.5ML; UG/.5ML; UG/.5ML; UG/.5ML; UG/.5ML; UG/.5ML; UG/.5ML; UG/.5ML; UG/.5ML; UG/.5ML; UG/.5ML; UG/.5ML; UG/.5ML
INJECTION, SUSPENSION INTRAMUSCULAR
Qty: 1 | Refills: 0 | Status: ACTIVE | COMMUNITY
Start: 2017-11-22

## 2018-06-25 NOTE — COUNSELING
[Activity counseling provided] : activity [Limited decision making ability] : Limited decision making ability [Good understanding] : Patient has a good understanding of lifestyle changes and the steps needed to achieve self management goals

## 2018-06-26 DIAGNOSIS — R79.89 OTHER SPECIFIED ABNORMAL FINDINGS OF BLOOD CHEMISTRY: ICD-10-CM

## 2018-06-26 LAB
25(OH)D3 SERPL-MCNC: 26.6 NG/ML
ALBUMIN SERPL ELPH-MCNC: 4.1 G/DL
ALP BLD-CCNC: 107 U/L
ALT SERPL-CCNC: 20 U/L
ANION GAP SERPL CALC-SCNC: 14 MMOL/L
AST SERPL-CCNC: 27 U/L
BILIRUB SERPL-MCNC: 0.4 MG/DL
BUN SERPL-MCNC: 31 MG/DL
CALCIUM SERPL-MCNC: 9.8 MG/DL
CHLORIDE SERPL-SCNC: 109 MMOL/L
CHOLEST SERPL-MCNC: 150 MG/DL
CHOLEST/HDLC SERPL: 3.3 RATIO
CO2 SERPL-SCNC: 21 MMOL/L
CREAT SERPL-MCNC: 1.44 MG/DL
CREAT SPEC-SCNC: 131 MG/DL
GLUCOSE SERPL-MCNC: 75 MG/DL
HBA1C MFR BLD HPLC: 5.8 %
HDLC SERPL-MCNC: 45 MG/DL
LDLC SERPL CALC-MCNC: 80 MG/DL
MICROALBUMIN 24H UR DL<=1MG/L-MCNC: 2.1 MG/DL
MICROALBUMIN/CREAT 24H UR-RTO: 16 MG/G
POTASSIUM SERPL-SCNC: 5 MMOL/L
PROT SERPL-MCNC: 8.1 G/DL
SODIUM SERPL-SCNC: 144 MMOL/L
TRIGL SERPL-MCNC: 127 MG/DL
TSH SERPL-ACNC: 2.43 UIU/ML
VIT B12 SERPL-MCNC: 678 PG/ML

## 2018-06-26 RX ORDER — ADHESIVE TAPE 3"X 2.3 YD
50 MCG TAPE, NON-MEDICATED TOPICAL
Qty: 90 | Refills: 3 | Status: ACTIVE | COMMUNITY
Start: 2018-06-26 | End: 1900-01-01

## 2018-06-28 LAB
BASOPHILS # BLD AUTO: 0.04 K/UL
BASOPHILS NFR BLD AUTO: 0.4 %
EOSINOPHIL # BLD AUTO: 0.16 K/UL
EOSINOPHIL NFR BLD AUTO: 1.7 %
HCT VFR BLD CALC: 42.8 %
HGB BLD-MCNC: 13.1 G/DL
IMM GRANULOCYTES NFR BLD AUTO: 0.3 %
LYMPHOCYTES # BLD AUTO: 3.26 K/UL
LYMPHOCYTES NFR BLD AUTO: 34.5 %
MAN DIFF?: NORMAL
MCHC RBC-ENTMCNC: 26.4 PG
MCHC RBC-ENTMCNC: 30.6 GM/DL
MCV RBC AUTO: 86.1 FL
MONOCYTES # BLD AUTO: 0.74 K/UL
MONOCYTES NFR BLD AUTO: 7.8 %
NEUTROPHILS # BLD AUTO: 5.22 K/UL
NEUTROPHILS NFR BLD AUTO: 55.3 %
PLATELET # BLD AUTO: 143 K/UL
RBC # BLD: 4.97 M/UL
RBC # FLD: 15.6 %
WBC # FLD AUTO: 9.45 K/UL

## 2018-07-20 ENCOUNTER — RX RENEWAL (OUTPATIENT)
Age: 83
End: 2018-07-20

## 2018-08-15 ENCOUNTER — RX RENEWAL (OUTPATIENT)
Age: 83
End: 2018-08-15

## 2018-09-10 ENCOUNTER — RX RENEWAL (OUTPATIENT)
Age: 83
End: 2018-09-10

## 2018-09-10 RX ORDER — HYDROCHLOROTHIAZIDE 12.5 MG/1
12.5 TABLET ORAL DAILY
Qty: 30 | Refills: 0 | Status: ACTIVE | COMMUNITY
Start: 2017-11-22 | End: 1900-01-01

## 2018-09-27 ENCOUNTER — RX RENEWAL (OUTPATIENT)
Age: 83
End: 2018-09-27

## 2018-10-24 ENCOUNTER — APPOINTMENT (OUTPATIENT)
Dept: INTERNAL MEDICINE | Facility: CLINIC | Age: 83
End: 2018-10-24
Payer: MEDICAID

## 2018-10-24 ENCOUNTER — NON-APPOINTMENT (OUTPATIENT)
Age: 83
End: 2018-10-24

## 2018-10-24 VITALS — DIASTOLIC BLOOD PRESSURE: 70 MMHG | SYSTOLIC BLOOD PRESSURE: 130 MMHG

## 2018-10-24 VITALS
WEIGHT: 139 LBS | DIASTOLIC BLOOD PRESSURE: 98 MMHG | HEART RATE: 66 BPM | SYSTOLIC BLOOD PRESSURE: 168 MMHG | RESPIRATION RATE: 12 BRPM | OXYGEN SATURATION: 98 % | TEMPERATURE: 98.3 F | HEIGHT: 55 IN | BODY MASS INDEX: 32.17 KG/M2

## 2018-10-24 DIAGNOSIS — I10 ESSENTIAL (PRIMARY) HYPERTENSION: ICD-10-CM

## 2018-10-24 DIAGNOSIS — I63.512 CEREBRAL INFARCTION DUE TO UNSPECIFIED OCCLUSION OR STENOSIS OF LEFT MIDDLE CEREBRAL ARTERY: ICD-10-CM

## 2018-10-24 DIAGNOSIS — E03.9 HYPOTHYROIDISM, UNSPECIFIED: ICD-10-CM

## 2018-10-24 DIAGNOSIS — E78.5 HYPERLIPIDEMIA, UNSPECIFIED: ICD-10-CM

## 2018-10-24 DIAGNOSIS — N18.9 CHRONIC KIDNEY DISEASE, UNSPECIFIED: ICD-10-CM

## 2018-10-24 PROCEDURE — 99214 OFFICE O/P EST MOD 30 MIN: CPT

## 2018-10-24 PROCEDURE — 93000 ELECTROCARDIOGRAM COMPLETE: CPT

## 2018-10-27 LAB
25(OH)D3 SERPL-MCNC: 26.6 NG/ML
ALBUMIN SERPL ELPH-MCNC: 4.1 G/DL
ALP BLD-CCNC: 117 U/L
ALT SERPL-CCNC: 20 U/L
ANION GAP SERPL CALC-SCNC: 15 MMOL/L
AST SERPL-CCNC: 24 U/L
BILIRUB SERPL-MCNC: 0.4 MG/DL
BUN SERPL-MCNC: 31 MG/DL
CALCIUM SERPL-MCNC: 9.1 MG/DL
CHLORIDE SERPL-SCNC: 105 MMOL/L
CHOLEST SERPL-MCNC: 128 MG/DL
CHOLEST/HDLC SERPL: 3 RATIO
CO2 SERPL-SCNC: 22 MMOL/L
CREAT SERPL-MCNC: 1.41 MG/DL
CREAT SPEC-SCNC: 91 MG/DL
FERRITIN SERPL-MCNC: 72 NG/ML
GLUCOSE SERPL-MCNC: 71 MG/DL
HBA1C MFR BLD HPLC: 5.7 %
HDLC SERPL-MCNC: 42 MG/DL
LDLC SERPL CALC-MCNC: 60 MG/DL
MICROALBUMIN 24H UR DL<=1MG/L-MCNC: <1.2 MG/DL
MICROALBUMIN/CREAT 24H UR-RTO: NORMAL
POTASSIUM SERPL-SCNC: 4.6 MMOL/L
PROT SERPL-MCNC: 7.2 G/DL
SODIUM SERPL-SCNC: 142 MMOL/L
TRIGL SERPL-MCNC: 130 MG/DL
TSH SERPL-ACNC: 6.62 UIU/ML
VIT B12 SERPL-MCNC: 605 PG/ML

## 2018-11-04 ENCOUNTER — RX RENEWAL (OUTPATIENT)
Age: 83
End: 2018-11-04

## 2018-11-04 RX ORDER — ATORVASTATIN CALCIUM 10 MG/1
10 TABLET, FILM COATED ORAL
Qty: 30 | Refills: 0 | Status: ACTIVE | COMMUNITY
Start: 2017-09-21 | End: 1900-01-01

## 2018-11-06 LAB
BASOPHILS # BLD AUTO: 0.03 K/UL
BASOPHILS NFR BLD AUTO: 0.4 %
EOSINOPHIL # BLD AUTO: 0.19 K/UL
EOSINOPHIL NFR BLD AUTO: 2.3 %
HCT VFR BLD CALC: 39.2 %
HGB BLD-MCNC: 12.6 G/DL
IMM GRANULOCYTES NFR BLD AUTO: 0.2 %
LYMPHOCYTES # BLD AUTO: 2.36 K/UL
LYMPHOCYTES NFR BLD AUTO: 28.4 %
MAN DIFF?: NORMAL
MCHC RBC-ENTMCNC: 27.2 PG
MCHC RBC-ENTMCNC: 32.1 GM/DL
MCV RBC AUTO: 84.7 FL
MONOCYTES # BLD AUTO: 0.66 K/UL
MONOCYTES NFR BLD AUTO: 7.9 %
NEUTROPHILS # BLD AUTO: 5.05 K/UL
NEUTROPHILS NFR BLD AUTO: 60.8 %
PLATELET # BLD AUTO: 122 K/UL
RBC # BLD: 4.63 M/UL
RBC # FLD: 15.1 %
WBC # FLD AUTO: 8.31 K/UL

## 2019-02-23 NOTE — PATIENT PROFILE ADULT. - FUNCTIONAL SCREEN CURRENT LEVEL: EATING, MLM
Abdomen soft, non-tender and non-distended, no rebound, no guarding and no masses. no hepatosplenomegaly.
(0) independent

## 2019-06-14 ENCOUNTER — APPOINTMENT (OUTPATIENT)
Dept: INTERNAL MEDICINE | Facility: CLINIC | Age: 84
End: 2019-06-14

## 2020-09-07 NOTE — ED PROVIDER NOTE - CHPI ED SYMPTOMS NEG
Patient discharged from ED by provider. Discharge instructions reviewed with patient and all questions answered. Patient ambulatory from ED in [facility]. no fever/no loss of consciousness/no dizziness

## 2022-05-04 NOTE — PHYSICAL THERAPY INITIAL EVALUATION ADULT - LEVEL OF CONSCIOUSNESS, REHAB EVAL
AT Evaluation                 Assessment/Plan    Subjective    Objective         Precautions: ***      Manuals                                                                 Neuro Re-Ed                                                                                                        Ther Ex                                                                                                                     Ther Activity                                       Gait Training                                       Modalities alert

## 2022-11-28 NOTE — PHYSICAL THERAPY INITIAL EVALUATION ADULT - GAIT DISTANCE, PT EVAL
[id number]

## 2023-11-03 NOTE — DISCHARGE NOTE ADULT - MEDICATION SUMMARY - MEDICATIONS TO CHANGE
Problem: Discharge Planning  Goal: Discharge to home or other facility with appropriate resources  Outcome: Progressing     Problem: Pain  Goal: Verbalizes/displays adequate comfort level or baseline comfort level  Outcome: Progressing     Problem: Safety - Adult  Goal: Free from fall injury  Outcome: Progressing     Problem: Neurosensory - Adult  Goal: Achieves stable or improved neurological status  Outcome: Progressing  Goal: Absence of seizures  Outcome: Progressing  Goal: Remains free of injury related to seizures activity  Outcome: Progressing  Goal: Achieves maximal functionality and self care  Outcome: Progressing     Problem: Cardiovascular - Adult  Goal: Maintains optimal cardiac output and hemodynamic stability  Outcome: Progressing  Goal: Absence of cardiac dysrhythmias or at baseline  Outcome: Progressing     Problem: Musculoskeletal - Adult  Goal: Return mobility to safest level of function  Outcome: Progressing  Goal: Maintain proper alignment of affected body part  Outcome: Progressing  Goal: Return ADL status to a safe level of function  Outcome: Progressing     Problem: Infection - Adult  Goal: Absence of infection at discharge  Outcome: Progressing  Goal: Absence of infection during hospitalization  Outcome: Progressing     Problem: Metabolic/Fluid and Electrolytes - Adult  Goal: Electrolytes maintained within normal limits  Outcome: Progressing  Goal: Hemodynamic stability and optimal renal function maintained  Outcome: Progressing     Problem: Hematologic - Adult  Goal: Maintains hematologic stability  Outcome: Progressing I will SWITCH the dose or number of times a day I take the medications listed below when I get home from the hospital:  None

## 2024-04-19 NOTE — ED PROVIDER NOTE - CROS ED CARDIOVAS ALL NEG
Problem: Adult Inpatient Plan of Care  Goal: Plan of Care Review  Outcome: Ongoing, Progressing  Goal: Patient-Specific Goal (Individualized)  Outcome: Ongoing, Progressing  Goal: Optimal Comfort and Wellbeing  Outcome: Ongoing, Progressing  Goal: Readiness for Transition of Care  Outcome: Ongoing, Progressing     Problem: Adjustment to Illness (Gastrointestinal Bleeding)  Goal: Optimal Coping with Acute Illness  Outcome: Ongoing, Progressing     Problem: Bleeding (Gastrointestinal Bleeding)  Goal: Hemostasis  Outcome: Ongoing, Progressing     Problem: Oral Intake Inadequate (Acute Kidney Injury/Impairment)  Goal: Optimal Nutrition Intake  Outcome: Ongoing, Progressing     Problem: Fluid Imbalance (Pneumonia)  Goal: Fluid Balance  Outcome: Ongoing, Progressing     Problem: Infection (Pneumonia)  Goal: Resolution of Infection Signs and Symptoms  Outcome: Ongoing, Progressing     Problem: Skin Injury Risk Increased  Goal: Skin Health and Integrity  Outcome: Ongoing, Progressing      negative...